# Patient Record
Sex: MALE | Race: BLACK OR AFRICAN AMERICAN | NOT HISPANIC OR LATINO | URBAN - METROPOLITAN AREA
[De-identification: names, ages, dates, MRNs, and addresses within clinical notes are randomized per-mention and may not be internally consistent; named-entity substitution may affect disease eponyms.]

---

## 2018-01-09 ENCOUNTER — INPATIENT (INPATIENT)
Facility: HOSPITAL | Age: 63
LOS: 2 days | Discharge: HOME | End: 2018-01-12
Attending: INTERNAL MEDICINE | Admitting: INTERNAL MEDICINE
Payer: COMMERCIAL

## 2018-01-09 VITALS
SYSTOLIC BLOOD PRESSURE: 153 MMHG | OXYGEN SATURATION: 98 % | RESPIRATION RATE: 16 BRPM | DIASTOLIC BLOOD PRESSURE: 98 MMHG | HEART RATE: 82 BPM | TEMPERATURE: 98 F

## 2018-01-09 LAB
ALBUMIN SERPL ELPH-MCNC: 4.7 G/DL — SIGNIFICANT CHANGE UP (ref 3.3–5)
ALP SERPL-CCNC: 48 U/L — SIGNIFICANT CHANGE UP (ref 40–120)
ALT FLD-CCNC: 32 U/L — SIGNIFICANT CHANGE UP (ref 4–41)
APPEARANCE UR: CLEAR — SIGNIFICANT CHANGE UP
AST SERPL-CCNC: 19 U/L — SIGNIFICANT CHANGE UP (ref 4–40)
BACTERIA # UR AUTO: SIGNIFICANT CHANGE UP
BASOPHILS # BLD AUTO: 0.01 K/UL — SIGNIFICANT CHANGE UP (ref 0–0.2)
BASOPHILS NFR BLD AUTO: 0.2 % — SIGNIFICANT CHANGE UP (ref 0–2)
BILIRUB SERPL-MCNC: 0.4 MG/DL — SIGNIFICANT CHANGE UP (ref 0.2–1.2)
BILIRUB UR-MCNC: NEGATIVE — SIGNIFICANT CHANGE UP
BLOOD UR QL VISUAL: NEGATIVE — SIGNIFICANT CHANGE UP
BUN SERPL-MCNC: 13 MG/DL — SIGNIFICANT CHANGE UP (ref 7–23)
CALCIUM SERPL-MCNC: 9 MG/DL — SIGNIFICANT CHANGE UP (ref 8.4–10.5)
CHLORIDE SERPL-SCNC: 102 MMOL/L — SIGNIFICANT CHANGE UP (ref 98–107)
CK MB BLD-MCNC: 1 — SIGNIFICANT CHANGE UP (ref 0–2.5)
CK MB BLD-MCNC: 1.33 NG/ML — SIGNIFICANT CHANGE UP (ref 1–6.6)
CK MB BLD-MCNC: 1.67 NG/ML — SIGNIFICANT CHANGE UP (ref 1–6.6)
CK MB BLD-MCNC: SIGNIFICANT CHANGE UP (ref 0–2.5)
CK SERPL-CCNC: 141 U/L — SIGNIFICANT CHANGE UP (ref 30–200)
CK SERPL-CCNC: 161 U/L — SIGNIFICANT CHANGE UP (ref 30–200)
CO2 SERPL-SCNC: 29 MMOL/L — SIGNIFICANT CHANGE UP (ref 22–31)
COLOR SPEC: YELLOW — SIGNIFICANT CHANGE UP
CREAT SERPL-MCNC: 1.03 MG/DL — SIGNIFICANT CHANGE UP (ref 0.5–1.3)
EOSINOPHIL # BLD AUTO: 0.07 K/UL — SIGNIFICANT CHANGE UP (ref 0–0.5)
EOSINOPHIL NFR BLD AUTO: 1.5 % — SIGNIFICANT CHANGE UP (ref 0–6)
GLUCOSE SERPL-MCNC: 82 MG/DL — SIGNIFICANT CHANGE UP (ref 70–99)
GLUCOSE UR-MCNC: NEGATIVE — SIGNIFICANT CHANGE UP
HCT VFR BLD CALC: 44.8 % — SIGNIFICANT CHANGE UP (ref 39–50)
HGB BLD-MCNC: 15.1 G/DL — SIGNIFICANT CHANGE UP (ref 13–17)
IMM GRANULOCYTES # BLD AUTO: 0.01 # — SIGNIFICANT CHANGE UP
IMM GRANULOCYTES NFR BLD AUTO: 0.2 % — SIGNIFICANT CHANGE UP (ref 0–1.5)
KETONES UR-MCNC: NEGATIVE — SIGNIFICANT CHANGE UP
LEUKOCYTE ESTERASE UR-ACNC: NEGATIVE — SIGNIFICANT CHANGE UP
LYMPHOCYTES # BLD AUTO: 1.91 K/UL — SIGNIFICANT CHANGE UP (ref 1–3.3)
LYMPHOCYTES # BLD AUTO: 40.9 % — SIGNIFICANT CHANGE UP (ref 13–44)
MCHC RBC-ENTMCNC: 30.3 PG — SIGNIFICANT CHANGE UP (ref 27–34)
MCHC RBC-ENTMCNC: 33.7 % — SIGNIFICANT CHANGE UP (ref 32–36)
MCV RBC AUTO: 90 FL — SIGNIFICANT CHANGE UP (ref 80–100)
MONOCYTES # BLD AUTO: 0.61 K/UL — SIGNIFICANT CHANGE UP (ref 0–0.9)
MONOCYTES NFR BLD AUTO: 13.1 % — SIGNIFICANT CHANGE UP (ref 2–14)
MUCOUS THREADS # UR AUTO: SIGNIFICANT CHANGE UP
NEUTROPHILS # BLD AUTO: 2.06 K/UL — SIGNIFICANT CHANGE UP (ref 1.8–7.4)
NEUTROPHILS NFR BLD AUTO: 44.1 % — SIGNIFICANT CHANGE UP (ref 43–77)
NITRITE UR-MCNC: NEGATIVE — SIGNIFICANT CHANGE UP
NRBC # FLD: 0 — SIGNIFICANT CHANGE UP
PH UR: 7 — SIGNIFICANT CHANGE UP (ref 4.6–8)
PLATELET # BLD AUTO: 235 K/UL — SIGNIFICANT CHANGE UP (ref 150–400)
PMV BLD: 10.2 FL — SIGNIFICANT CHANGE UP (ref 7–13)
POTASSIUM SERPL-MCNC: 3.8 MMOL/L — SIGNIFICANT CHANGE UP (ref 3.5–5.3)
POTASSIUM SERPL-SCNC: 3.8 MMOL/L — SIGNIFICANT CHANGE UP (ref 3.5–5.3)
PROT SERPL-MCNC: 7.5 G/DL — SIGNIFICANT CHANGE UP (ref 6–8.3)
PROT UR-MCNC: 20 MG/DL — SIGNIFICANT CHANGE UP
RBC # BLD: 4.98 M/UL — SIGNIFICANT CHANGE UP (ref 4.2–5.8)
RBC # FLD: 13 % — SIGNIFICANT CHANGE UP (ref 10.3–14.5)
RBC CASTS # UR COMP ASSIST: SIGNIFICANT CHANGE UP (ref 0–?)
SODIUM SERPL-SCNC: 143 MMOL/L — SIGNIFICANT CHANGE UP (ref 135–145)
SP GR SPEC: 1.02 — SIGNIFICANT CHANGE UP (ref 1–1.04)
SQUAMOUS # UR AUTO: SIGNIFICANT CHANGE UP
TROPONIN T SERPL-MCNC: < 0.06 NG/ML — SIGNIFICANT CHANGE UP (ref 0–0.06)
TROPONIN T SERPL-MCNC: < 0.06 NG/ML — SIGNIFICANT CHANGE UP (ref 0–0.06)
TSH SERPL-MCNC: 1.38 UIU/ML — SIGNIFICANT CHANGE UP (ref 0.27–4.2)
UROBILINOGEN FLD QL: NORMAL MG/DL — SIGNIFICANT CHANGE UP
WBC # BLD: 4.67 K/UL — SIGNIFICANT CHANGE UP (ref 3.8–10.5)
WBC # FLD AUTO: 4.67 K/UL — SIGNIFICANT CHANGE UP (ref 3.8–10.5)
WBC UR QL: HIGH (ref 0–?)

## 2018-01-09 PROCEDURE — 71046 X-RAY EXAM CHEST 2 VIEWS: CPT | Mod: 26

## 2018-01-09 RX ORDER — SODIUM CHLORIDE 9 MG/ML
1000 INJECTION INTRAMUSCULAR; INTRAVENOUS; SUBCUTANEOUS ONCE
Qty: 0 | Refills: 0 | Status: COMPLETED | OUTPATIENT
Start: 2018-01-09 | End: 2018-01-09

## 2018-01-09 RX ORDER — MECLIZINE HCL 12.5 MG
25 TABLET ORAL ONCE
Qty: 0 | Refills: 0 | Status: COMPLETED | OUTPATIENT
Start: 2018-01-09 | End: 2018-01-09

## 2018-01-09 RX ORDER — AMLODIPINE BESYLATE 2.5 MG/1
5 TABLET ORAL DAILY
Qty: 0 | Refills: 0 | Status: DISCONTINUED | OUTPATIENT
Start: 2018-01-09 | End: 2018-01-12

## 2018-01-09 RX ADMIN — Medication 25 MILLIGRAM(S): at 16:15

## 2018-01-09 RX ADMIN — SODIUM CHLORIDE 1000 MILLILITER(S): 9 INJECTION INTRAMUSCULAR; INTRAVENOUS; SUBCUTANEOUS at 16:15

## 2018-01-09 NOTE — ED CDU PROVIDER INITIAL DAY NOTE - OBJECTIVE STATEMENT
62 y.o male pmhx of HTN on amlodipine presents with nausea and dizziness for the past week, saw his PMD today who advised him to go to the ED for further evaluation. Pt states the nausea comes and goes and has episode sof intermittent vomiting. The dizziness he desribes only happens when exerting himself or lifting/working. States he feels "funny" no blurry vision, no room spinning sensation but more presynopal and has to sit down and rest for ~ 1 hour for symptoms to subside.  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 Denies fevers, chills, chest pain, SOB, cough, diarrhea, abdominal pain, numbness, tingling, weakness, urinary symptoms.  ER note above: ROHINI Reyna: 61 y/o male with a hx of HTN presents to the ER c/o 1 week of intermittent nausea and dizziness.  Symptoms occur during exertion.  Pt reports a few episodes of vomiting.  Pt denies chest pain, shortness of breath, abdominal pain, back pain, fevers, chills, recent illness.  Pt placed in CDU for cardiac monitoring and stress test.

## 2018-01-09 NOTE — ED ADULT TRIAGE NOTE - CHIEF COMPLAINT QUOTE
Pt sent in by his pmd for nausea and vomiting x 2 weeks Pt also c/o dizziness, worse with movement that started yesterday.  denies any pain, fevers, or shortness of breath, hx of htn, fs 121

## 2018-01-09 NOTE — ED ADULT NURSE NOTE - OBJECTIVE STATEMENT
pt received to intake 8, aox3.  pt c/o dizziness x a few days.  SL placed, labs sent, NS infusing well.  MD at bedside, will monitor

## 2018-01-09 NOTE — ED PROVIDER NOTE - ATTENDING CONTRIBUTION TO CARE
Luli: 61 yo male with no significant past medical history c/o dizziness and near syncope x several weeks worse x 4 days, now associated with SOB, nausea, and vomiting. Symptoms occur mostly with exertion although now are happening even at rest. No associated chest pain. No recent immobilization or travel. No LE pain or edema. Exam: abdomen is soft and nontender, no palpable masses. cardiac and lung exam unremarkable. no LE edema or calf TTP. 2+ distal pulses x 4 extremities. A/P- 61 yo male with symptoms concerning for cardiac ischemia. EKG unremarkbale. will obtain, labs, cardiac enzymes, CXR, EKG and likely CDU stay.

## 2018-01-09 NOTE — ED PROVIDER NOTE - OBJECTIVE STATEMENT
62 y.o male pmhx of HTN on amlodipine presents with nausea and dizziness for the past week, saw his PMD today who advised him to go to the ED for further evaluation. Pt states the nausea comes and goes and has episode sof intermittent vomiting. The dizziness he desribes only happens when exerting himself or lifting/working. States he feels "funny" no blurry vision, no room spinning sensation but more presynopal and has to sit down and rest for ~ 1 hour for symptoms to subside.  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 Denies fevers, chills, chest pain, SOB, cough, diarrhea, abdominal pain, numbness, tingling, weakness, urinary symptoms. 62 y.o male pmhx of HTN on amlodipine presents with nausea and dizziness for the past week, saw his PMD today who advised him to go to the ED for further evaluation. Pt states the nausea comes and goes and has episodes of intermittent vomiting. The dizziness he describes only happens when exerting himself or lifting/working. States he feels "funny" no blurry vision, no room spinning sensation but more presynopal and has to sit down and rest for ~ 1 hour for symptoms to subside.  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 Denies fevers, chills, chest pain, SOB, cough, diarrhea, abdominal pain, numbness, tingling, weakness, urinary symptoms.

## 2018-01-09 NOTE — ED CDU PROVIDER INITIAL DAY NOTE - ATTENDING CONTRIBUTION TO CARE
Luli: 63 yo male c/o intermittent dizziness, nausea, and vomiting x 1 week. Symptoms worsening and occurring with exertion and more frequently now. No abdominal pain or chest pain. +associated SOB. Exam: cardiac and lung exam unremarkable, no LE edema or calf TTP. 2+ distal pulses x 4 extremities. abdomen is soft and nontender ED workup until this point unremarkable, CDU plan for serial enzymes/EKG and stress in AM.

## 2018-01-09 NOTE — ED PROVIDER NOTE - MEDICAL DECISION MAKING DETAILS
62 y.o male pmhx of HTN on amlodipine presents with nausea and dizziness for the past week, dizziness is exertional and described for as presyncopal. will obtain labs, EKG, chest xray, cardiac enzymes, meclizine, fluids, ua, reassesses. 62 y.o male pmhx of HTN on amlodipine presents with nausea and dizziness for the past week, dizziness is exertional and described as presyncopal. will obtain labs, EKG, chest xray, cardiac enzymes, meclizine, fluids, ua, reassesses.

## 2018-01-09 NOTE — ED PROVIDER NOTE - PROGRESS NOTE DETAILS
ROHINI Jean: pt admits to feeling better, discussed staying in CDU for CE and Stress. Pt agrees with plan, discussed with ROHINI Reyna.

## 2018-01-10 LAB — HBA1C BLD-MCNC: 6.1 % — HIGH (ref 4–5.6)

## 2018-01-10 PROCEDURE — 78452 HT MUSCLE IMAGE SPECT MULT: CPT | Mod: 26

## 2018-01-10 PROCEDURE — 93018 CV STRESS TEST I&R ONLY: CPT | Mod: GC

## 2018-01-10 PROCEDURE — 93016 CV STRESS TEST SUPVJ ONLY: CPT | Mod: GC

## 2018-01-10 RX ADMIN — AMLODIPINE BESYLATE 5 MILLIGRAM(S): 2.5 TABLET ORAL at 05:25

## 2018-01-10 NOTE — ED CDU PROVIDER SUBSEQUENT DAY NOTE - MEDICAL DECISION MAKING DETAILS
63 yo M here for dizziness, near syncope which improved with rest. no events overnight. pending stress.

## 2018-01-10 NOTE — ED CDU PROVIDER SUBSEQUENT DAY NOTE - PROGRESS NOTE DETAILS
ROHINI Reyna: Pt sleeping comfortably in Bed NAD.  Pt on cardiac monitor.  Cardiac Enzymes x 2 negative.  Pt scheduled for stress test in AM.  Will continue to monitor. Pt signed out to me by ROHINI Knott: Pt is in the CDU for resting images in the AM as his stress test had possible abnormality. Pt is resting comfortably in bed, NAD, VSS on cardiac monitor. Will continue to monitor

## 2018-01-11 DIAGNOSIS — R07.9 CHEST PAIN, UNSPECIFIED: ICD-10-CM

## 2018-01-11 DIAGNOSIS — R94.39 ABNORMAL RESULT OF OTHER CARDIOVASCULAR FUNCTION STUDY: ICD-10-CM

## 2018-01-11 DIAGNOSIS — Z29.9 ENCOUNTER FOR PROPHYLACTIC MEASURES, UNSPECIFIED: ICD-10-CM

## 2018-01-11 DIAGNOSIS — I10 ESSENTIAL (PRIMARY) HYPERTENSION: ICD-10-CM

## 2018-01-11 LAB — TROPONIN T SERPL-MCNC: < 0.06 NG/ML — SIGNIFICANT CHANGE UP (ref 0–0.06)

## 2018-01-11 RX ORDER — ASPIRIN/CALCIUM CARB/MAGNESIUM 324 MG
162 TABLET ORAL DAILY
Qty: 0 | Refills: 0 | Status: DISCONTINUED | OUTPATIENT
Start: 2018-01-11 | End: 2018-01-11

## 2018-01-11 RX ORDER — ASPIRIN/CALCIUM CARB/MAGNESIUM 324 MG
81 TABLET ORAL DAILY
Qty: 0 | Refills: 0 | Status: DISCONTINUED | OUTPATIENT
Start: 2018-01-11 | End: 2018-01-12

## 2018-01-11 RX ORDER — ATORVASTATIN CALCIUM 80 MG/1
40 TABLET, FILM COATED ORAL AT BEDTIME
Qty: 0 | Refills: 0 | Status: DISCONTINUED | OUTPATIENT
Start: 2018-01-11 | End: 2018-01-12

## 2018-01-11 RX ORDER — METOPROLOL TARTRATE 50 MG
12.5 TABLET ORAL
Qty: 0 | Refills: 0 | Status: DISCONTINUED | OUTPATIENT
Start: 2018-01-11 | End: 2018-01-12

## 2018-01-11 RX ORDER — HEPARIN SODIUM 5000 [USP'U]/ML
5000 INJECTION INTRAVENOUS; SUBCUTANEOUS EVERY 12 HOURS
Qty: 0 | Refills: 0 | Status: DISCONTINUED | OUTPATIENT
Start: 2018-01-11 | End: 2018-01-12

## 2018-01-11 RX ADMIN — Medication 162 MILLIGRAM(S): at 19:31

## 2018-01-11 RX ADMIN — AMLODIPINE BESYLATE 5 MILLIGRAM(S): 2.5 TABLET ORAL at 06:58

## 2018-01-11 RX ADMIN — ATORVASTATIN CALCIUM 40 MILLIGRAM(S): 80 TABLET, FILM COATED ORAL at 22:28

## 2018-01-11 NOTE — ED CDU PROVIDER SUBSEQUENT DAY NOTE - CARDIAC, MLM
Normal rate, regular rhythm.  Heart sounds S1, S2.  No murmurs, rubs or gallops.
Normal rate, regular rhythm.  Heart sounds S1, S2.

## 2018-01-11 NOTE — ED CDU PROVIDER SUBSEQUENT DAY NOTE - ATTENDING CONTRIBUTION TO CARE
Dr. Malave:  I performed a face to face bedside interview with patient regarding history of present illness, review of symptoms and past medical history. I completed an independent physical exam.  I have discussed patient's plan of care with PA.   I agree with note as stated above, having amended the EMR as needed to reflect my findings.   This includes HISTORY OF PRESENT ILLNESS, HIV, PAST MEDICAL/SURGICAL/FAMILY/SOCIAL HISTORY, ALLERGIES AND HOME MEDICATIONS, REVIEW OF SYSTEMS, PHYSICAL EXAM, and any PROGRESS NOTES during the time I functioned as the attending physician for this patient.    62M h/o HTN sent by PCP to ED for evaluation of intermittent dizziness and nausea associated with exertion/activity.  Describes near-syncope sensation, occurred while shoveling snow, improved with rest.  No complaints overnight in CDU.       Exam:  - nad  - rrr  - ctab  - abd soft ntnd  - no pedal edema  - normal gait, sensation equal to light touch bilateral extremities, strength equal bilateral extremities;    A/P  - dizziness with exertion, r/o ACS  - pending stress test this morning
I, Jennifer Cabot, MD, have performed a history and physical exam of the patient and discussed their management with the ACP.  I reviewed the PA's note and agree with the documented findings and plan of care. My medical decision making and observations are found above.    Cabot: 62M with HTN, here with 1 week of nausea and dizziness with exertion.  Trops are neg x 2, CXR neg.  Denies sx at this time and with ambulation this AM.  On exam, HDS, NAD, lungs CTAB, heart sounds with systolic murmur, regular rhythm, abd benign, LEs without edema.  Pending resting images today.

## 2018-01-11 NOTE — H&P ADULT - GASTROINTESTINAL DETAILS
bowel sounds normal/normal/nontender/no distention/no rebound tenderness/no rigidity/soft/no guarding

## 2018-01-11 NOTE — CONSULT NOTE ADULT - SUBJECTIVE AND OBJECTIVE BOX
Patient seen and evaluated @ 4:00 PM  Chief Complaint: Dizziness    HPI:  62M with HTN presents with nausea and dizziness/lightheadedness x 1 week. Sent in to ED by PMD. Nausea and dizziness intermittent, worse with exertion and strenuous activity. In particular worse with shoveling snow. Resolves after 5 minutes of rest. No CP, palpitations, SOB. No LOC.    No hx of cardiac disease or MI. No family hx of CAD.    EKG unremarkable.    Abnormal stress test as below.    Plan for cardiac cath.    PMH:   No pertinent past medical history    PSH:   No significant past surgical history    Medications:   amLODIPine   Tablet 5 milliGRAM(s) Oral daily  aspirin  chewable 162 milliGRAM(s) Oral daily    Allergies:  No Known Allergies    FAMILY HISTORY:  No pertinent family history in first degree relatives    Social History:  Never smoker    Review of Systems:  Constitutional: [ ] Fever [ ] Chills [ ] Fatigue [ ] Weight change   HEENT: [ ] Blurred vision [ ] Eye Pain [ ] Headache [ ] Runny nose [ ] Sore Throat   Respiratory: [ ] Cough [ ] Wheezing [ ] Shortness of breath  Cardiovascular: [ ] Chest Pain [ ] Palpitations [ ] JACOBS [ ] PND [ ] Orthopnea  Gastrointestinal: [ ] Abdominal Pain [ ] Diarrhea [ ] Constipation [ ] Hemorrhoids [ ] Nausea [ ] Vomiting  Genitourinary: [ ] Nocturia [ ] Dysuria [ ] Incontinence  Extremities: [ ] Swelling [ ] Joint Pain  Neurologic: [ ] Focal deficit [ ] Paresthesias [ ] Syncope  Lymphatic: [ ] Swelling [ ] Lymphadenopathy   Skin: [ ] Rash [ ] Ecchymoses [ ] Wounds [ ] Lesions  Psychiatry: [ ] Depression [ ] Suicidal/Homicidal Ideation [ ] Anxiety [ ] Sleep Disturbances  [ ] 10 point review of systems is otherwise negative except as mentioned above            [ ]Unable to obtain    Physical Exam:  T(C): 37.1 (01-11-18 @ 14:39), Max: 37.2 (01-11-18 @ 10:15)  HR: 88 (01-11-18 @ 14:39) (68 - 90)  BP: 137/94 (01-11-18 @ 14:39) (134/92 - 146/99)  RR: 16 (01-11-18 @ 10:15) (16 - 16)  SpO2: 99% (01-11-18 @ 10:15) (97% - 99%)  Wt(kg): --    Daily     Daily     Appearance: NAD  Eyes: PERRL, EOMI  HENT: Normal oral mus[place]a, NC/AT  Cardiovascular: normal S1 and S2, RRR, no m/r/g, no edema, normal JVP  Respiratory: Clear to auscultation bilaterally  Gastrointestinal: Soft, non-tender, non-distended, BS+  Musculoskeletal: No clubbing, no joint deformity   Neurologic: Non-focal  Lymphatic: No lymphadenopathy  Psychiatry: AAOx3, mood & affect appropriate  Skin: No rashes, no ecchymoses, no cyanosis    Cardiovascular Diagnostic Testing:  ECG: NSR, no significant ischemic changes    Echo:  none    Stress Testing:  IMPRESSIONS:Normal Study  * Myocardial Perfusion SPECT results are probably  abnormal.  * There is a small, moderate defect in inferior wall, that  was reversble, suggestive of ischemia. The observed defect  was significantly less prominent on prone imaging for  attenuation correction, suggesting that the observed  findings may in part be due to attenuation from the  diaphragm.  * Post-stress gated wall motion analysis was performed  (LVEF = 56 %;LVEDV = 85 ml.), revealing normal LV  function. there was focal proximal to mid inferior wall  hypokinesis with mildly diminished systolic thickening. RV  function appeared normal.    Cath:  none    Interpretation of Telemetry: sinus    Imaging:  CXR wnl    Labs:            CARDIAC MARKERS ( 09 Jan 2018 22:15 )  x     / < 0.06 ng/mL / 161 u/L / 1.67 ng/mL / x              Hemoglobin A1C, Whole Blood: 6.1 % (01-09 @ 15:58)    Thyroid Stimulating Hormone, Serum: 1.38 uIU/mL (01-09 @ 16:05)

## 2018-01-11 NOTE — H&P ADULT - PROBLEM SELECTOR PLAN 1
Will monitor on telemetry, serial EKG and CE prn for any episodes of chest pain  HgbA1C, TSH, lipid profile, CBC, CMP in am  Started patient on Metroprolol 12.5mg BID, Lipitor 40mg and Aspirin 81mg daily   Will make patient NPO after midnight for LHC in am 1/12

## 2018-01-11 NOTE — H&P ADULT - NSHPLABSRESULTS_GEN_ALL_CORE
CXR: No focal consolidations or edema.    1/11/18 Nuclear stress test: Post-stress gated wall motion analysis was performed (LVEF= 56 %;LVEDV = 85 ml.), revealing normal LV function. There was focal proximal to mid inferior wall hypokinesis with mildly diminished systolic thickening. RV function appeared normal. IMPRESSIONS: Normal Study.  Myocardial Perfusion SPECT results are probably abnormal. There is a small, moderate defect in inferior wall, that was reversble, suggestive of ischemia. The observed defect was significantly less prominent on prone imaging for attenuation correction, suggesting that the observed findings may in part be due to attenuation from the diaphragm. Post-stress gated wall motion analysis was performed (LVEF = 56 %;LVEDV = 85 ml.), revealing normal LV function. there was focal proximal to mid inferior wall hypokinesis with mildly diminished systolic thickening. RV function appeared normal.    EKG: NSR at a rate of 80 with Qtc of 422

## 2018-01-11 NOTE — ED CDU PROVIDER DISPOSITION NOTE - CLINICAL COURSE
62M with HTN, here with 1 week of nausea and dizziness with exertion.  Trops were neg x 2, CXR neg.  Stress test was abnormal, prompting resting images that showed a large reversible defect in the anterior wall.  Spoke with cardiology, who would like to admit for a catheterization today.  The patient is current asymptomatic.  On exam, HDS, NAD, lungs CTAB, heart sounds with systolic murmur, regular rhythm, abd benign, LEs without edema.  Will admit the patient.

## 2018-01-11 NOTE — H&P ADULT - NEGATIVE NEUROLOGICAL SYMPTOMS
no headache/no loss of consciousness/no hemiparesis/no focal seizures/no syncope/no difficulty walking/no tremors/no vertigo/no loss of sensation/no confusion/no weakness/no generalized seizures/no transient paralysis/no paresthesias

## 2018-01-11 NOTE — H&P ADULT - ATTENDING COMMENTS
Pt seen and examined agree with plan above     1) + stress test - s/p cath, shows LM normal, LAD mid 20%, LCX normal and RCA normal     2) HTN - uncontrolled increase norvasc to 10mg daily     3) Dizziness - improved  d/c home

## 2018-01-11 NOTE — ED CDU PROVIDER SUBSEQUENT DAY NOTE - GASTROINTESTINAL, MLM
Abdomen soft, non-tender, no rebound, no guarding.
Abdomen soft, non-tender, no rebound, no guarding.

## 2018-01-11 NOTE — H&P ADULT - NEGATIVE GASTROINTESTINAL SYMPTOMS
no diarrhea/no melena/no change in bowel habits/no hematochezia/no abdominal pain/no constipation/no nausea/no vomiting

## 2018-01-11 NOTE — ED CDU PROVIDER SUBSEQUENT DAY NOTE - HISTORY
62yM with pmhx HTN presented to the ED with one week of intermittent nausea and dizziness upon exertion. Pt had negative cardiac enzymes and is on cardiac monitoring. Stress test with possible abnormality, pending rest images in the morning.
63 y/o male with a hx of HTN presents to the ER c/o 1 week of intermittent nausea and dizziness.  Symptoms occur during exertion.  Pt reports symptoms have occurred in the past and typically resolve after sitting and resting.  Pt denies chest pain, shortness of breath, abdominal pain, back pain, fevers, chills, recent illness.  Pt placed in CDU for cardiac monitoring and stress test. CE x 2 negative and tele uneventful, pending stress this AM.

## 2018-01-11 NOTE — ED CDU PROVIDER SUBSEQUENT DAY NOTE - SKIN, MLM
Skin normal color for race, warm, dry and intact. No evidence of rash.
Skin normal color for race, warm, dry and intact. No evidence of rash.

## 2018-01-11 NOTE — H&P ADULT - HISTORY OF PRESENT ILLNESS
63 y/o M with PMH of HTN presented with nausea and dizziness/lightheadedness for the past week. As per the patient he developed dizziness and lightheadedness for the past week but the worst episode was on Monday. Patient stated that he was exerting himself, taking the snow out of his car when he developed sudden onset lightheadedness and then he sat down and within few seconds the dizziness/lightheadedness resolved by itself. Patient stated that when he is resting the dizziness and lightheadedness does not occur. Patient stated that on Tuesday he had similar episode and went to the PMD and was told to come to the ER. Patient stated that he had 2 episodes of NBNB vomiting yesterday. Patient stated that this episode of the dizziness/lightheadedness was more severe than the prior episodes he has had in the past. Patient denied any CP, fevers, chills, N/V/D/C, abdominal pain, cough, melena, hematochezia, dysuria, recent travel, sick contact, pleuritic or positional chest pain.     On ED admission EKG revealed NSR at a rate of 80 with Qtc of 422, CE x 2: Negative, HgA1C: 6.1, UA: Negative. CXR: No focal consolidations or edema. 1/11/18 Nuclear stress test: Post-stress gated wall motion analysis was performed (LVEF= 56 %;LVEDV = 85 ml.), revealing normal LV function. There was focal proximal to mid inferior wall hypokinesis with mildly diminished systolic thickening. RV function appeared normal. IMPRESSIONS: Normal Study.  Myocardial Perfusion SPECT results are probably abnormal. There is a small, moderate defect in inferior wall, that was reversible, suggestive of ischemia. The observed defect was significantly less prominent on prone imaging for attenuation correction, suggesting that the observed findings may in part be due to attenuation from the diaphragm. Post-stress gated wall motion analysis was performed (LVEF = 56 %;LVEDV = 85 ml.), revealing normal LV function. There was focal proximal to mid inferior wall hypokinesis with mildly diminished systolic thickening. RV function appeared normal. When examined patient is resting in the stretcher and denied any current complaints such as dizziness/lightheadedness or any CP.

## 2018-01-11 NOTE — ED CDU PROVIDER DISPOSITION NOTE - ATTENDING CONTRIBUTION TO CARE
I, Jennifer Cabot, MD, have performed a history and physical exam of the patient and discussed their management with the ACP.  I wrote th note. My medical decision making and observations are found above.    62M with HTN, here with 1 week of nausea and dizziness with exertion.  Trops were neg x 2, CXR neg.  Stress test was abnormal, prompting resting images that showed a large reversible defect in the anterior wall.  Spoke with cardiology, who would like to admit for a catheterization today.  The patient is current asymptomatic.  On exam, HDS, NAD, lungs CTAB, heart sounds with systolic murmur, regular rhythm, abd benign, LEs without edema.  Will admit the patient.

## 2018-01-11 NOTE — H&P ADULT - NEGATIVE ENMT SYMPTOMS
no nasal congestion/no sinus symptoms/no tinnitus/no vertigo/no hearing difficulty/no ear pain/no nasal discharge

## 2018-01-11 NOTE — H&P ADULT - RS GEN PE MLT RESP DETAILS PC
no wheezes/no chest wall tenderness/rales/no intercostal retractions/normal/no rhonchi/respirations non-labored/airway patent

## 2018-01-11 NOTE — ED CDU PROVIDER SUBSEQUENT DAY NOTE - PROGRESS NOTE DETAILS
ROHINI Clark: pt NAD, VSS, not complaining of lightheadedness, CP, SOB. No events on tele. PE: cardiac: RRR, Lungs: CTA, abdomen: soft, nontender, nondistended. Discussed the results of the labs/imaging with the patient. Pending resting images. Cabot: Pt signed out to me.  62M with HTN, here with 1 week of nausea and dizziness with exertion.  Trops are neg x 2, CXR neg.  Denies sx at this time and with ambulation this AM.  On exam, HDS, NAD, lungs CTAB, heart sounds with systolic murmur, regular rhythm, abd benign, LEs without edema.  Pending resting images today. Cabot:  Received a call from the stress lab stating that the resting images were very abnormal, with a large reversible defect in the anterior wall.  Notified the patient and planned to call the tele doc, but the patient stated that he wanted to leave.  I stressed the importance of staying and being further evaluated with a cath, stated that he could have an MI with a risk of death if he leaves, but he thinks that we are delaying treatment on purpose.  I explained that it takes time to read stress tests, and we were not delaying and that I would very much like him to stay.  He then agreed to speak with the tele doc.  Will call the tele doc now. Cabot:  Received a call from the stress lab stating that the resting images were very abnormal, with a large reversible defect in the anterior wall.  Notified the patient and planned to call a cardiology consult, but the patient stated that he wanted to leave.  I stressed the importance of staying and being further evaluated with a cath, stated that he could have an MI with a risk of death if he leaves, but he thinks that we are delaying treatment on purpose.  I explained that it takes time to read stress tests, and we were not delaying and that I would very much like him to stay.  He then agreed to speak with the cardiologist.  Will call for a cardiology consult now. Cabot: Spoke with the cardiology fellow, who agrees that the patient needs to be admitted for a cath and stated that he will be down to speak with the patient as soon as possible.

## 2018-01-11 NOTE — ED CDU PROVIDER SUBSEQUENT DAY NOTE - CARDIOVASCULAR NEGATIVE STATEMENT, MLM
normal rate and rhythm, no chest pain and no edema.
normal rate and rhythm, no chest pain and no edema. + dizziness

## 2018-01-11 NOTE — H&P ADULT - NEGATIVE MUSCULOSKELETAL SYMPTOMS
no stiffness/no arthralgia/no myalgia/no arthritis/no muscle cramps/no neck pain/no joint swelling/no muscle weakness

## 2018-01-11 NOTE — CONSULT NOTE ADULT - ASSESSMENT
62M with HTN presents with nausea and dizziness/lightheadedness x 1 week with abnormal stress test with reversible defect in inferior wall and hypokinesis. Plan for cardiac cath and likely RCA intervention.    -- will require DAPT post procedure  -- metoprolol 12.5 mg BID  -- atorvastatin 40 mg    Klever Gao MD

## 2018-01-11 NOTE — H&P ADULT - NEGATIVE CARDIOVASCULAR SYMPTOMS
no paroxysmal nocturnal dyspnea/no dyspnea on exertion/no orthopnea/no chest pain/no peripheral edema/no palpitations

## 2018-01-11 NOTE — ED CDU PROVIDER SUBSEQUENT DAY NOTE - MUSCULOSKELETAL, MLM
Spine appears normal, range of motion is not limited, no muscle or joint tenderness
Spine appears normal, range of motion is not limited, no muscle or joint tenderness

## 2018-01-11 NOTE — ED CDU PROVIDER SUBSEQUENT DAY NOTE - MEDICAL DECISION MAKING DETAILS
62yM w/pmhx HTN presented with one week of nausea and dizziness with exertion. Negative cardiac enzymes, abnormal stress in CDU for resting images in the AM 62yM w/pmhx HTN presented with one week of nausea and dizziness with exertion. Negative cardiac enzymes, abnormal stress in CDU for resting images in the AM    Cabot: 62M with HTN, here with 1 week of nausea and dizziness with exertion.  Trops are neg x 2, CXR neg.  Denies sx at this time and with ambulation this AM.  On exam, HDS, NAD, lungs CTAB, heart sounds with systolic murmur, regular rhythm, abd benign, LEs without edema.  Pending resting images today.

## 2018-01-11 NOTE — H&P ADULT - NEGATIVE OPHTHALMOLOGIC SYMPTOMS
no lacrimation L/no diplopia/no blurred vision L/no photophobia/no discharge L/no blurred vision R/no discharge R/no lacrimation R

## 2018-01-12 ENCOUNTER — TRANSCRIPTION ENCOUNTER (OUTPATIENT)
Age: 63
End: 2018-01-12

## 2018-01-12 VITALS
OXYGEN SATURATION: 99 % | HEART RATE: 74 BPM | DIASTOLIC BLOOD PRESSURE: 97 MMHG | TEMPERATURE: 98 F | RESPIRATION RATE: 16 BRPM | SYSTOLIC BLOOD PRESSURE: 154 MMHG

## 2018-01-12 LAB
BUN SERPL-MCNC: 16 MG/DL — SIGNIFICANT CHANGE UP (ref 7–23)
CALCIUM SERPL-MCNC: 8.5 MG/DL — SIGNIFICANT CHANGE UP (ref 8.4–10.5)
CHLORIDE SERPL-SCNC: 103 MMOL/L — SIGNIFICANT CHANGE UP (ref 98–107)
CHOLEST SERPL-MCNC: 204 MG/DL — HIGH (ref 120–199)
CO2 SERPL-SCNC: 27 MMOL/L — SIGNIFICANT CHANGE UP (ref 22–31)
CREAT SERPL-MCNC: 1.05 MG/DL — SIGNIFICANT CHANGE UP (ref 0.5–1.3)
GLUCOSE SERPL-MCNC: 108 MG/DL — HIGH (ref 70–99)
HCT VFR BLD CALC: 43.3 % — SIGNIFICANT CHANGE UP (ref 39–50)
HDLC SERPL-MCNC: 40 MG/DL — SIGNIFICANT CHANGE UP (ref 35–55)
HGB BLD-MCNC: 14.9 G/DL — SIGNIFICANT CHANGE UP (ref 13–17)
LIPID PNL WITH DIRECT LDL SERPL: 149 MG/DL — SIGNIFICANT CHANGE UP
MAGNESIUM SERPL-MCNC: 2.1 MG/DL — SIGNIFICANT CHANGE UP (ref 1.6–2.6)
MCHC RBC-ENTMCNC: 30.5 PG — SIGNIFICANT CHANGE UP (ref 27–34)
MCHC RBC-ENTMCNC: 34.4 % — SIGNIFICANT CHANGE UP (ref 32–36)
MCV RBC AUTO: 88.5 FL — SIGNIFICANT CHANGE UP (ref 80–100)
NRBC # FLD: 0 — SIGNIFICANT CHANGE UP
PLATELET # BLD AUTO: 210 K/UL — SIGNIFICANT CHANGE UP (ref 150–400)
PMV BLD: 10.2 FL — SIGNIFICANT CHANGE UP (ref 7–13)
POTASSIUM SERPL-MCNC: 3.8 MMOL/L — SIGNIFICANT CHANGE UP (ref 3.5–5.3)
POTASSIUM SERPL-SCNC: 3.8 MMOL/L — SIGNIFICANT CHANGE UP (ref 3.5–5.3)
RBC # BLD: 4.89 M/UL — SIGNIFICANT CHANGE UP (ref 4.2–5.8)
RBC # FLD: 12.8 % — SIGNIFICANT CHANGE UP (ref 10.3–14.5)
SODIUM SERPL-SCNC: 141 MMOL/L — SIGNIFICANT CHANGE UP (ref 135–145)
TRIGL SERPL-MCNC: 98 MG/DL — SIGNIFICANT CHANGE UP (ref 10–149)
WBC # BLD: 4.7 K/UL — SIGNIFICANT CHANGE UP (ref 3.8–10.5)
WBC # FLD AUTO: 4.7 K/UL — SIGNIFICANT CHANGE UP (ref 3.8–10.5)

## 2018-01-12 RX ORDER — AMLODIPINE BESYLATE 2.5 MG/1
1 TABLET ORAL
Qty: 30 | Refills: 0
Start: 2018-01-12 | End: 2018-02-10

## 2018-01-12 RX ORDER — SODIUM CHLORIDE 9 MG/ML
500 INJECTION INTRAMUSCULAR; INTRAVENOUS; SUBCUTANEOUS
Qty: 0 | Refills: 0 | Status: DISCONTINUED | OUTPATIENT
Start: 2018-01-12 | End: 2018-01-12

## 2018-01-12 RX ORDER — AMLODIPINE BESYLATE 2.5 MG/1
5 TABLET ORAL ONCE
Qty: 0 | Refills: 0 | Status: DISCONTINUED | OUTPATIENT
Start: 2018-01-12 | End: 2018-01-12

## 2018-01-12 RX ORDER — AMLODIPINE BESYLATE 2.5 MG/1
1 TABLET ORAL
Qty: 0 | Refills: 0 | COMMUNITY

## 2018-01-12 RX ADMIN — AMLODIPINE BESYLATE 5 MILLIGRAM(S): 2.5 TABLET ORAL at 07:16

## 2018-01-12 RX ADMIN — HEPARIN SODIUM 5000 UNIT(S): 5000 INJECTION INTRAVENOUS; SUBCUTANEOUS at 07:16

## 2018-01-12 RX ADMIN — Medication 12.5 MILLIGRAM(S): at 07:16

## 2018-01-12 NOTE — DISCHARGE NOTE ADULT - CARE PROVIDER_API CALL
Yvon Shaw), Cardiovascular Disease; Internal Medicine; Nuclear Cardiology  [address] [place] [address]  Phone: [phone]  Fax: [phone]

## 2018-01-12 NOTE — DISCHARGE NOTE ADULT - PATIENT PORTAL LINK FT
“You can access the [facility] Patient Portal, offered by [facility], by registering with the following website: [email or web address]”

## 2018-01-12 NOTE — DISCHARGE NOTE ADULT - CARE PLAN
Principal Discharge DX:	Atypical chest pain  Goal:	remain chest pain free  Instructions for follow-up, activity and diet:	better blood pressure management  low salt, low fat, low cholesterol  Secondary Diagnosis:	Essential hypertension  Goal:	goal /60  Instructions for follow-up, activity and diet:	increase Norvasc to 10mg oral daily  low salt, low fat, low cholesterol   Follow up with Dr Shaw in 1-2 weeks Principal Discharge DX:	Atypical chest pain  Goal:	remain chest pain free  Instructions for follow-up, activity and diet:	better blood pressure management  low salt, low fat, low cholesterol  Secondary Diagnosis:	Essential hypertension  Goal:	goal /60  Instructions for follow-up, activity and diet:	increase Norvasc to 10mg oral daily  low salt, low fat, low cholesterol   Follow up with Dr Shaw in 1-2 weeks  Secondary Diagnosis:	Pre-diabetes  Goal:	monitor your pre-diabetes  Instructions for follow-up, activity and diet:	continue to monitor your pre-diabetes with your PMD

## 2018-01-12 NOTE — DISCHARGE NOTE ADULT - HOSPITAL COURSE
63 y/o M with PMH of HTN presented with nausea and dizziness/lightheadedness for the past week. As per the patient he developed dizziness and lightheadedness for the past week but the worst episode was on Monday. Patient stated that he was exerting himself, taking the snow out of his car when he developed sudden onset lightheadedness and then he sat down and within few seconds the dizziness/lightheadedness resolved by itself. Patient stated that when he is resting the dizziness and lightheadedness does not occur. Patient stated that on Tuesday he had similar episode and went to the PMD and was told to come to the ER. Patient stated that he had 2 episodes of NBNB vomiting yesterday. Patient stated that this episode of the dizziness/lightheadedness was more severe than the prior episodes he has had in the past. Patient denied any CP, fevers, chills, N/V/D/C, abdominal pain, cough, melena, hematochezia, dysuria, recent travel, sick contact, pleuritic or positional chest pain.     On ED admission EKG revealed NSR at a rate of 80 with Qtc of 422, CE x 2: Negative, HgA1C: 6.1, UA: Negative. CXR: No focal consolidations or edema. 1/11/18 Nuclear stress test: Post-stress gated wall motion analysis was performed (LVEF= 56 %;LVEDV = 85 ml.), revealing normal LV function. There was focal proximal to mid inferior wall hypokinesis with mildly diminished systolic thickening. RV function appeared normal. IMPRESSIONS: Normal Study.  Myocardial Perfusion SPECT results are probably abnormal. There is a small, moderate defect in inferior wall, that was reversible, suggestive of ischemia. The observed defect was significantly less prominent on prone imaging for attenuation correction, suggesting that the observed findings may in part be due to attenuation from the diaphragm. Post-stress gated wall motion analysis was performed (LVEF = 56 %;LVEDV = 85 ml.), revealing normal LV function. There was focal proximal to mid inferior wall hypokinesis with mildly diminished systolic thickening. RV function appeared normal. When examined patient is resting in the stretcher and denied any current complaints such as dizziness/lightheadedness or any CP.     Underwent a Cardiac catheterization via right radial artery access with failed approach requiring right femoral artery access revealing normal coronary arteries. Patient stable for discharge this afternoon as discussed with Dr MICHAEL Shaw with increased norvasc dose to 10mg daily. Patient to be discharge if radial and groin sites remain stable with ambulation and with outpatient Cardiology follow up with Dr Shaw to optimize BP management. 63 y/o M with PMH of HTN presented with nausea and dizziness/lightheadedness for the past week. As per the patient he developed dizziness and lightheadedness for the past week but the worst episode was on Monday. Patient stated that he was exerting himself, taking the snow out of his car when he developed sudden onset lightheadedness and then he sat down and within few seconds the dizziness/lightheadedness resolved by itself. Patient stated that when he is resting the dizziness and lightheadedness does not occur. Patient stated that on Tuesday he had similar episode and went to the PMD and was told to come to the ER. Patient stated that he had 2 episodes of NBNB vomiting yesterday. Patient stated that this episode of the dizziness/lightheadedness was more severe than the prior episodes he has had in the past. Patient denied any CP, fevers, chills, N/V/D/C, abdominal pain, cough, melena, hematochezia, dysuria, recent travel, sick contact, pleuritic or positional chest pain.     On ED admission EKG revealed NSR at a rate of 80 with Qtc of 422, CE x 2: Negative, HgA1C: 6.1, UA: Negative. CXR: No focal consolidations or edema. 1/11/18 Nuclear stress test: Post-stress gated wall motion analysis was performed (LVEF= 56 %;LVEDV = 85 ml.), revealing normal LV function. There was focal proximal to mid inferior wall hypokinesis with mildly diminished systolic thickening. RV function appeared normal. IMPRESSIONS: Normal Study.  Myocardial Perfusion SPECT results are probably abnormal. There is a small, moderate defect in inferior wall, that was reversible, suggestive of ischemia. The observed defect was significantly less prominent on prone imaging for attenuation correction, suggesting that the observed findings may in part be due to attenuation from the diaphragm. Post-stress gated wall motion analysis was performed (LVEF = 56 %;LVEDV = 85 ml.), revealing normal LV function. There was focal proximal to mid inferior wall hypokinesis with mildly diminished systolic thickening. RV function appeared normal. When examined patient is resting in the stretcher and denied any current complaints such as dizziness/lightheadedness or any CP.     Underwent a Cardiac catheterization via right radial artery access with failed approach requiring right femoral artery access revealing EF 60%, LVEDP 11, normal coronary arteries. Patient stable for discharge this afternoon as discussed with Dr MICHAEL Shaw with increased norvasc dose to 10mg daily. Patient to be discharge if radial and groin sites remain stable with ambulation and with outpatient Cardiology follow up with Dr Shaw to optimize BP management.   The patient was noted to have elevated HgbA1c 6.1. The patient was made aware. Prediabetes education given. The patient was recommended to f/u with PMD for further treatment.

## 2018-01-12 NOTE — DISCHARGE NOTE ADULT - PLAN OF CARE
remain chest pain free better blood pressure management  low salt, low fat, low cholesterol goal /60 increase Norvasc to 10mg oral daily  low salt, low fat, low cholesterol   Follow up with Dr Shaw in 1-2 weeks monitor your pre-diabetes continue to monitor your pre-diabetes with your PMD

## 2018-01-12 NOTE — CONSULT NOTE ADULT - SUBJECTIVE AND OBJECTIVE BOX
Chief Complaint/Reason for Admission: Dizziness/lightheadedness and nausea	  History of Present Illness: 	  63 y/o M with PMH of HTN presented with nausea and dizziness/lightheadedness for the past week. As per the patient he developed dizziness and lightheadedness for the past week but the worst episode was on Monday. Patient stated that he was exerting himself, taking the snow out of his car when he developed sudden onset lightheadedness and then he sat down and within few seconds the dizziness/lightheadedness resolved by itself. Patient stated that when he is resting the dizziness and lightheadedness does not occur. Patient stated that on Tuesday he had similar episode and went to the PMD and was told to come to the ER. Patient stated that he had 2 episodes of NBNB vomiting yesterday. Patient stated that this episode of the dizziness/lightheadedness was more severe than the prior episodes he has had in the past. Patient denied any CP, fevers, chills, N/V/D/C, abdominal pain, cough, melena, hematochezia, dysuria, recent travel, sick contact, pleuritic or positional chest pain.     On ED admission EKG revealed NSR at a rate of 80 with Qtc of 422, CE x 2: Negative, HgA1C: 6.1, UA: Negative. CXR: No focal consolidations or edema. 1/11/18 Nuclear stress test: Post-stress gated wall motion analysis was performed (LVEF= 56 %;LVEDV = 85 ml.), revealing normal LV function. There was focal proximal to mid inferior wall hypokinesis with mildly diminished systolic thickening. RV function appeared normal. IMPRESSIONS: Normal Study.  Myocardial Perfusion SPECT results are probably abnormal. There is a small, moderate defect in inferior wall, that was reversible, suggestive of ischemia. The observed defect was significantly less prominent on prone imaging for attenuation correction, suggesting that the observed findings may in part be due to attenuation from the diaphragm. Post-stress gated wall motion analysis was performed (LVEF = 56 %;LVEDV = 85 ml.), revealing normal LV function. There was focal proximal to mid inferior wall hypokinesis with mildly diminished systolic thickening. RV function appeared normal. When examined patient is resting in the stretcher and denied any current complaints such as dizziness/lightheadedness or any CP.     Review of Systems:  · Negative General Symptoms	no fever; no chills; no sweating; no anorexia; no weight loss; no weight gain; no malaise; no fatigue	  · Negative Skin Symptoms	no rash; no itching; no dryness	  · Negative Ophthalmologic Symptoms	no diplopia; no photophobia; no lacrimation L; no lacrimation R; no blurred vision L; no blurred vision R; no discharge L; no discharge R	  · Negative ENMT Symptoms	no hearing difficulty; no ear pain; no tinnitus; no vertigo; no sinus symptoms; no nasal congestion; no nasal discharge	  · Negative Respiratory and Thorax Symptoms	no wheezing; no dyspnea; no cough; no hemoptysis; no pleuritic chest pain	  · Negative Cardiovascular Symptoms	no chest pain; no palpitations; no dyspnea on exertion; no orthopnea; no paroxysmal nocturnal dyspnea; no peripheral edema	  · Negative Gastrointestinal Symptoms	no nausea; no vomiting; no diarrhea; no constipation; no change in bowel habits; no abdominal pain; no melena; no hematochezia	  · Negative General Genitourinary Symptoms	no hematuria; no renal colic; no flank pain L; no flank pain R; no dysuria	  · Negative Musculoskeletal Symptoms	no arthralgia; no arthritis; no joint swelling; no myalgia; no muscle cramps; no muscle weakness; no stiffness; no neck pain	  · Negative Neurological Symptoms	no transient paralysis; no weakness; no paresthesias; no generalized seizures; no focal seizures; no syncope; no tremors; no vertigo; no loss of sensation; no difficulty walking; no headache; no loss of consciousness; no hemiparesis; no confusion	  · Neurological Comments	Dizziness and lightheadedness	      Allergies and Intolerances:        Allergies:  	No Known Allergies:     Home Medications:   * Incomplete Medication History as of 09-Jan-2018 21:04 documented in Structured Notes  · 	amLODIPine 5 mg oral tablet: 1 tab(s) orally once a day, Last Dose Taken:      Patient History:   Past Medical History:  No pertinent past medical history.    Past Surgical History:  No significant past surgical history.    Family History:  No pertinent family history in first degree relatives.    Social History:  Social History (marital status, living situation, occupation, tobacco use, alcohol and drug use, and sexual history): , lives with wife, retired mailman Never smoked/drank/used any illicit drugs	    Tobacco Screening:  · Core Measure Site	No	  · Has the patient used tobacco in the past 30 days?	No	    Risk Assessment:   Present on Admission:  Deep Venous Thrombosis	no	  Pulmonary Embolus	no	  Urinary Catheter	no	  Central Venous Catheter/PICC Line	no	  Surgical Site Incision	no	  Pressure Ulcer(s)	no	    Heart Failure:  Does this patient have a history of or has been diagnosed with heart failure? no.      Physical Exam:  · Constitutional	detailed exam	  · Constitutional Details	well-developed; well-groomed; well-nourished; no distress	  · Eyes	detailed exam	  · Eyes Details	conjunctiva clear	  · Neck	detailed exam	  · Neck Details	normal; supple; no JVD	  · Respiratory	detailed exam	  · Respiratory Details	normal; airway patent; respirations non-labored; no chest wall tenderness; no intercostal retractions; no rhonchi; no wheezes; rales	  · Rales	lower L; lower R	  · Cardiovascular	detailed exam	  · Cardiovascular Details	regular rate and rhythm  no rub  no murmur	  · Cardiovascular Details	positive S1; positive S2	  · Gastrointestinal	detailed exam	  · GI Normal	normal; soft; nontender; no distention; bowel sounds normal; no rebound tenderness; no guarding; no rigidity	  · Extremities	detailed exam	  · Extremities Details	normal; no clubbing; no cyanosis; no pedal edema	  · Vascular	detailed exam	  · Radial Pulse	right normal; left normal; +2 pulses b/l	  · DP Pulse	+1 pulses b/l	  · PT Pulse	+1 pulses b/l	  · Neurological	detailed exam	  · Neurological Details	alert and oriented x 3; responds to verbal commands; sensation intact	  · Musculoskeletal	detailed exam	  · Musculoskeletal Details	no joint swelling; no joint erythema; no joint warmth; no calf tenderness	      Labs and Results:  Labs, Radiology, Cardiology, and Other Results: CXR: No focal consolidations or edema.   1/11/18 Nuclear stress test: Post-stress gated wall motion analysis was performed (LVEF= 56 %;LVEDV = 85 ml.), revealing normal LV function. There was focal proximal to mid inferior wall hypokinesis with mildly diminished systolic thickening. RV function appeared normal. IMPRESSIONS: Normal Study.  Myocardial Perfusion SPECT results are probably abnormal. There is a small, moderate defect in inferior wall, that was reversble, suggestive of ischemia. The observed defect was significantly less prominent on prone imaging for attenuation correction, suggesting that the observed findings may in part be due to attenuation from the diaphragm. Post-stress gated wall motion analysis was performed (LVEF = 56 %;LVEDV = 85 ml.), revealing normal LV function. there was focal proximal to mid inferior wall hypokinesis with mildly diminished systolic thickening. RV function appeared normal.   EKG: NSR at a rate of 80 with Qtc of 422	    Assessment and Plan:   Assessment:  · Assessment		  63 y/o M with PMH of HTN presented with nausea and dizziness x 1 week.     +Abnormal stress test    Problem/Plan - 1:  ·  Problem: Abnormal stress test.  Plan: Will monitor on telemetry, serial EKG and CE prn for any episodes of chest pain  HgbA1C, TSH, lipid profile, CBC, CMP in am  Started patient on Metroprolol 12.5mg BID, Lipitor 40mg and Aspirin 81mg daily   s./p cardiac cath - nl coronaries    Problem/Plan - 2:  ·  Problem: Essential hypertension.  Plan: Continue with antihypertensive medications   DASH diet recommended.       Nausea- resolved, benign abd exam

## 2018-01-12 NOTE — DISCHARGE NOTE ADULT - INSTRUCTIONS
No heavy lifting greater than 5-10 pounds x one week.  No strenuous activity x 1 week.  Monitor site of procedure and notify your doctor for any redness/swelling/discharge.     No driving x 24 hours.  No heavy lifting for one week. No strenuous activity x 1 week.  Monitor site of procedure and notify your doctor for any redness/swelling/discharge.  You may shower but no baths or swimming for one week or until skin is healed. Call MD for return appt., or any return of symptoms, or any problems with wrist or groin area

## 2018-01-12 NOTE — DISCHARGE NOTE ADULT - MEDICATION SUMMARY - MEDICATIONS TO TAKE
I will START or STAY ON the medications listed below when I get home from the hospital:    amLODIPine 10 mg oral tablet  -- 1 tab(s) by mouth once a day  -- Indication: For hypertension

## 2020-10-16 ENCOUNTER — APPOINTMENT (OUTPATIENT)
Dept: DERMATOLOGY | Facility: CLINIC | Age: 65
End: 2020-10-16
Payer: COMMERCIAL

## 2020-10-16 VITALS — HEIGHT: 73 IN | BODY MASS INDEX: 27.3 KG/M2 | WEIGHT: 206 LBS

## 2020-10-16 DIAGNOSIS — L28.0 LICHEN SIMPLEX CHRONICUS: ICD-10-CM

## 2020-10-16 PROBLEM — Z00.00 ENCOUNTER FOR PREVENTIVE HEALTH EXAMINATION: Status: ACTIVE | Noted: 2020-10-16

## 2020-10-16 PROCEDURE — 99203 OFFICE O/P NEW LOW 30 MIN: CPT

## 2020-10-16 RX ORDER — FLUTICASONE PROPIONATE 0.05 MG/G
0.01 OINTMENT TOPICAL
Qty: 1 | Refills: 0 | Status: ACTIVE | COMMUNITY
Start: 2020-10-16 | End: 1900-01-01

## 2020-10-16 NOTE — ASSESSMENT
[FreeTextEntry1] : LSC/pruritus scroti; \par \par Therapeutic options and their risks and benefits; along with multiple diagnostic possibilities were discussed at length;\par \par cutivate ointment qd x 10d;  aquaphor in AM;  \par continue aquaphor maintenance; \par f/u 1 month to recheck;  do not over use steroid

## 2020-10-16 NOTE — PHYSICAL EXAM
[FreeTextEntry3] : Skin examination performed of the face, neck, chest, hands, lower legs;\par The patient is well, alert and oriented, pleasant and cooperative.\par Eyelids, conjunctivae, oral mucosa, digits and nails all normal.  \par No cervical adenopathy.\par \par \par Erythematous scaling patches with inflammation and lichenification;  b/l crural folds, scrotum; \par

## 2020-10-16 NOTE — HISTORY OF PRESENT ILLNESS
[de-identified] : Pt. c/o itchy rash in groin/genital area;  over 1 month; \par treated with ketoconazole cream, also taking terbinafine for fingernails;

## 2020-11-16 ENCOUNTER — APPOINTMENT (OUTPATIENT)
Dept: DERMATOLOGY | Facility: CLINIC | Age: 65
End: 2020-11-16

## 2021-05-29 NOTE — DISCHARGE NOTE ADULT - DO YOU HAVE DIFFICULTY CLIMBING STAIRS
HISTORY OF PRESENT ILLNESS  Patient reports that he has not been able to swallow pills. He has times with choking with drinking water. Sometimes at night while sleeping he has awakened in a panicky situation feeling that he is choking. He has had heartburn and GERD. He is taking ranitidine. He has also had difficulty swallowing solids. He has to eat in small bites. He also reports some hoarseness.     REVIEW OF SYSTEMS  Review of Systems: a 10-system review was performed. Pertinent positives are noted in the HPI and on a separate scanned document in the chart.    PMH, PSH, FH and SH has documented in the EHR.      EXAM    CONSTITUTIONAL  General Appearance:  Normal, well developed, well nourished, no obvious distress  Ability to Communicate:  communicates appropriately.    HEAD AND FACE  Appearance and Symmetry:  Normal, no scalp or facial scarring or suspicious lesions.    EARS  Clinical speech reception threshold:  Normal, able to hear normal speech.  Auricle:  Normal, Auricles without scars, lesions, masses.  External auditory canal:  Normal, External auditory canal normal.  Tympanic membrane:  Normal, Tympanic membranes normal without swelling or erythema.  Tympanic membrane mobility:  Normal, Normal tympanic membrane mobility.    NOSE (speculum or scope)  Architecture:  Normal, Grossly normal external nasal architecture with no masses or lesions.  Mucosa:  Normal mucosa, No polyps or masses.  Septum:  Normal, Septum non-obstructing.  Turbinates:  Normal, No turbinate abnormalities    ORAL CAVITY AND OROPHARYNX  Lips:  Normal.  Dental and gingiva:  Normal, No obvious dental or gingival disease.  Mucosa:  Normal, Moist mucous membranes.  Tongue:  Normal, Tongue mobile with no mucosal abnormalities  Hard and soft palate:  Normal, Hard and soft palate without cleft or mucosal lesions.  Oral pharynx:  Normal, Posterior pharynx without lesions or remarkable asymmetry.  Saliva:  Normal, Clear saliva.  Masses:  Normal,  Patient called no answer unable to leave VM No palpable masses or pathologically enlarged lymph nodes.    LARYNGOSCOPY  Risks and benefit of Flexible laryngeal endoscopy were discussed with the patient and they wished to proceed.  The nose was sprayed with 4% lidocaine / 1% phenylephrine.  After allowing adequate time for anesthesia the procedure was started.  Patient tolerated the procedure well.  See exam note for findings.    LARYNX AND HYPOPHARYNX (mirror or scope)  Voice Quality:  Normal voice quality.  Supra glottis:  Normal, No edema or erythema.  Epiglottis:  Normal, No epiglottic mass or mucosal lesions.  Pyriform sinuses:  Normal, Pyriform sinuses clear.  Vocal cords appearance:  Normal, Vocal cord motion symmetric.  Vocal cord motion:  Normal, Vocal cord motion symmetric  Endolarynx:  Normal, No Endolaryngeal mass or mucosal lesions.    NECK  Masses/lymph nodes:  Normal, No worrisome neck masses or lymph nodes.  Salivary glands:  Normal, Parotid and submandibular glands.  Trachea and larynx position:  Normal, Trachea and larynx midline.  Thyroid:  Normal, No thyroid abnormality.  Tenderness:  Normal, No cervical tenderness.  Suppleness:  Normal, Neck supple    NEUROLOGICAL  Speech pattern:  Normal, Proasaic    RESPIRATORY  Symmetry and Respiratory effort:  Normal, Symmetric chest movement and expansion with no increased intercostal retractions or use of accessory muscles.     IMPRESSION  Dysphagia. Exam is unremarkable. No obvious mass or obstruction of the oropharynx or hypopharynx.     RECOMMENDATION  Barium swallow to assess esophageal motility and possible narrowing or obstruciton.    Dhaval Condon MD   No

## 2021-08-24 ENCOUNTER — NON-APPOINTMENT (OUTPATIENT)
Age: 66
End: 2021-08-24

## 2021-08-24 ENCOUNTER — APPOINTMENT (OUTPATIENT)
Dept: DERMATOLOGY | Facility: CLINIC | Age: 66
End: 2021-08-24
Payer: COMMERCIAL

## 2021-08-24 VITALS — WEIGHT: 205 LBS | HEIGHT: 72 IN | BODY MASS INDEX: 27.77 KG/M2

## 2021-08-24 DIAGNOSIS — L03.011 CELLULITIS OF RIGHT FINGER: ICD-10-CM

## 2021-08-24 PROCEDURE — 99214 OFFICE O/P EST MOD 30 MIN: CPT

## 2021-08-24 RX ORDER — CLOTRIMAZOLE AND BETAMETHASONE DIPROPIONATE 10; .5 MG/G; MG/G
1-0.05 CREAM TOPICAL
Qty: 1 | Refills: 1 | Status: ACTIVE | COMMUNITY
Start: 2021-08-24 | End: 1900-01-01

## 2021-11-22 ENCOUNTER — EMERGENCY (EMERGENCY)
Facility: HOSPITAL | Age: 66
LOS: 0 days | Discharge: HOME | End: 2021-11-22
Payer: COMMERCIAL

## 2021-11-22 VITALS
WEIGHT: 212.08 LBS | RESPIRATION RATE: 17 BRPM | OXYGEN SATURATION: 98 % | SYSTOLIC BLOOD PRESSURE: 123 MMHG | TEMPERATURE: 99 F | DIASTOLIC BLOOD PRESSURE: 86 MMHG | HEART RATE: 97 BPM

## 2021-11-22 DIAGNOSIS — L03.011 CELLULITIS OF RIGHT FINGER: ICD-10-CM

## 2021-11-22 DIAGNOSIS — L02.511 CUTANEOUS ABSCESS OF RIGHT HAND: ICD-10-CM

## 2021-11-22 PROCEDURE — 10060 I&D ABSCESS SIMPLE/SINGLE: CPT

## 2021-11-22 PROCEDURE — 99283 EMERGENCY DEPT VISIT LOW MDM: CPT | Mod: 25

## 2021-11-22 RX ORDER — ACETAMINOPHEN 500 MG
650 TABLET ORAL ONCE
Refills: 0 | Status: COMPLETED | OUTPATIENT
Start: 2021-11-22 | End: 2021-11-22

## 2021-11-22 RX ADMIN — Medication 650 MILLIGRAM(S): at 17:10

## 2021-11-22 RX ADMIN — Medication 300 MILLIGRAM(S): at 17:10

## 2021-11-22 NOTE — ED ADULT NURSE NOTE - OBJECTIVE STATEMENT
Pt presents with thumb pain and swelling. Swelling appeared 3x days ago. Pt denies any trauma to thumb. Pus noted at bottom of cuticle.

## 2021-11-22 NOTE — ED PROVIDER NOTE - PATIENT PORTAL LINK FT
You can access the FollowMyHealth Patient Portal offered by [facility] by registering at the following website: [email or web address]. By joining [facility]’s FollowMyHealth portal, you will also be able to view your health information using other applications (apps) compatible with our system.

## 2021-11-22 NOTE — ED PROVIDER NOTE - CARE PROVIDER_API CALL
Terell Gonzales (MD)  Plastic Surgery  [address] [place] [address], [address]  [place], [place] [id number]  Phone: [phone]  Fax: [phone]  Follow Up Time:

## 2021-11-22 NOTE — ED PROVIDER NOTE - NSFOLLOWUPINSTRUCTIONS_ED_ALL_ED_FT
Advance activity as tolerated.  Continue all previously prescribed medications as directed unless otherwise instructed.  Follow up with your Hand surgeon on Wednesday,   Return to the ER for worsening or persistent symptoms, increased swelling with fever and/or ANY NEW OR CONCERNING SYMPTOMS. If you have issues obtaining follow up, please call: [phone] to obtain a doctor or specialist who takes your insurance in your area.  You may call [phone] to make an appointment with the internal medicine clinic.     Take clindamycin 300mg 3 times a day for 7 days. take this with food. even if your symptoms are improving you should complete the full course of antibiotics.     Take Tylenol 650mg every 6 hours as needed for pain    apply warm soaks every few hours for 20 minutes at a time to allow for further drainage.

## 2021-11-22 NOTE — ED PROVIDER NOTE - CLINICAL SUMMARY MEDICAL DECISION MAKING FREE TEXT BOX
65 y/o M pmhx HTN c/o swelling around right thumb nail and pain since Friday. limited ROM due to swelling surrounding the paronychia. post drainage improved ROM however considering extent of possible overlying cellulitis will cover with clindamycin PO and patient will follow up with hand on Wednesday.

## 2021-11-22 NOTE — ED PROVIDER NOTE - PROGRESS NOTE DETAILS
NP Ham: patient with increased ROM to right thumb IP joint with improvement of pain. post paranychea drainage procedure. will discharge on clinda and follow-up with hand. patient stable for discharge

## 2021-11-22 NOTE — ED ADULT NURSE NOTE - PRIMARY CARE PROVIDER
[Normal Conjunctiva] : the conjunctiva exhibited no abnormalities [Auscultation Breath Sounds / Voice Sounds] : lungs were clear to auscultation bilaterally [Heart Sounds] : normal S1 and S2 [Bowel Sounds] : normal bowel sounds [Abdomen Soft] : soft [Abdomen Tenderness] : non-tender [Abnormal Walk] : normal gait [Cyanosis, Localized] : no localized cyanosis [] : no rash [Affect] : the affect was normal [FreeTextEntry1] : pleasant Not at this time

## 2021-11-22 NOTE — ED PROVIDER NOTE - OBJECTIVE STATEMENT
65 y/o M pmhx HTN c/o swelling around right thumb nail and pain since Friday. he states that the pain started at the base of the finger nail and then slowly spread down his thumb now with swelling to the distal aspect of the thumb and joint. denies fever, chills. painful to touch. denies hx of diabetes. denies numbness/tingling to extremity.

## 2022-07-10 NOTE — PATIENT PROFILE ADULT. - BLOOD AVOIDANCE/RESTRICTIONS, PROFILE
Patient ID: Aleksandra Verdugo is a 3year old female. Chief Complaint   Patient presents with   â¢ Sports Physical   â¢ Office Visit       [address] 4 yo F, Patient here with mother for sports physical, participating in cheer. Denies previous sports injuries, fractures, hospitalizations, head trauma, concussions, LOC, seizures. Denies cardiac anomalies, asthma, or other health conditions. Denies cardiac or respiratory symptoms. Has never been denied clearance for sports in the past.  Denies family history of heart problems, murmurs, arrhythmias, Marfan syndrome, connective tissue disorders, or sudden deaths. See Sports history and physical form        Patient's medications, allergies, past medical, surgical, social and family histories were reviewed and updated as appropriate. Review of Systems   Constitutional: Negative for activity change, appetite change, chills, fatigue, fever and irritability. HENT: Negative for congestion, ear pain, postnasal drip, rhinorrhea, sinus pressure, sinus pain, sneezing and sore throat. Eyes: Negative for pain and discharge. Respiratory: Negative for chest tightness, shortness of breath and wheezing. Cardiovascular: Negative for chest pain. Gastrointestinal: Negative for abdominal distention, abdominal pain, constipation, diarrhea, nausea and vomiting. Genitourinary: Negative for flank pain and urgency. Skin: Negative for color change and rash. Allergic/Immunologic: Negative for environmental allergies and food allergies. Neurological: Negative for dizziness, light-headedness and headaches. Psychiatric/Behavioral: Negative for agitation and confusion. Patient Active Problem List   Diagnosis   â¢ Allergic rhinitis   â¢ Umbilical hernia   â¢ Chronic pain of left knee   â¢ Acute URI   â¢ Gastroenteritis       No current outpatient medications on file. No current facility-administered medications for this visit.        Past Medical History:   Diagnosis Date   â¢ "Allergy        Social History     Socioeconomic History   â¢ Marital status: Single     Spouse name: Not on file   â¢ Number of children: Not on file   â¢ Years of education: Not on file   â¢ Highest education level: Not on file   Occupational History   â¢ Not on file   Tobacco Use   â¢ Smoking status: Never Smoker   â¢ Smokeless tobacco: Never Used   Vaping Use   â¢ Vaping Use: never used   Substance and Sexual Activity   â¢ Alcohol use: Not on file   â¢ Drug use: Not on file   â¢ Sexual activity: Not on file   Other Topics Concern   â¢ Not on file   Social History Narrative   â¢ Not on file     Social Determinants of Health     Financial Resource Strain: Not on file   Food Insecurity: Not on file   Transportation Needs: Not on file   Physical Activity: Not on file   Stress: Not on file   Social Connections: Not on file   Intimate Partner Violence: Not on file       Social History     Tobacco Use   Smoking Status Never Smoker   Smokeless Tobacco Never Used       Past Surgical History:   Procedure Laterality Date   â¢ Repair umbilical ATPU,7+I/C,OWDXP         Family History   Problem Relation Age of Onset   â¢ Cancer Maternal Grandmother    â¢ Cancer Maternal Grandfather        No exam data present    ALLERGIES:  No Known Allergies    Immunization History   Administered Date(s) Administered   â¢ DTaP/HIB/IPV 2013, 2013, 2013, 11/22/2014   â¢ DTaP/IPV 08/15/2018   â¢ Hep A, Unspecified formulation 11/22/2014   â¢ Hep B, adult 2013, 2013, 2013   â¢ MMR 11/22/2014   â¢ Measles Mumps Rubella Varicella 08/15/2018   â¢ Pneumococcal Conjugate 13 Valent Vacc (Prevnar 13) 2013, 2013, 2013, 11/22/2014   â¢ Rotavirus - monovalent 2013, 2013, 2013   â¢ Varicella 11/22/2014       Visit Vitals  /68   Pulse 74   Resp 18   Ht 4' 9"" (1.448 m)   Wt 34.9 kg (76 lb 15.1 oz)   BMI 16.65 kg/mÂ²       Physical Exam  Vitals and nursing note reviewed.    Constitutional:       General: She " is active. Appearance: She is well-developed. HENT:      Mouth/Throat:      Pharynx: Oropharynx is clear. Neck: Normal range of motion and neck supple. Eyes:      Conjunctiva/sclera: Conjunctivae normal.      Pupils: Pupils are equal, round, and reactive to light. Cardiovascular:      Rate and Rhythm: Normal rate and regular rhythm. Heart sounds: S1 normal and S2 normal.   Pulmonary:      Effort: Pulmonary effort is normal.      Breath sounds: Normal breath sounds. Abdominal:      General: Bowel sounds are normal.      Palpations: Abdomen is soft. Musculoskeletal:         General: Normal range of motion. Skin:     General: Skin is warm and dry. Capillary Refill: Capillary refill takes less than 2 seconds. Neurological:      Mental Status: She is alert.          Plan:  Sports physical      Discussion:Cleared for participation in cheer  without restrictions          Jany Landa CNP none

## 2023-07-15 ENCOUNTER — EMERGENCY (EMERGENCY)
Facility: HOSPITAL | Age: 68
LOS: 0 days | Discharge: HOME | End: 2023-07-16
Attending: EMERGENCY MEDICINE
Payer: COMMERCIAL

## 2023-07-15 VITALS
DIASTOLIC BLOOD PRESSURE: 70 MMHG | TEMPERATURE: 101 F | WEIGHT: 205.03 LBS | RESPIRATION RATE: 16 BRPM | HEART RATE: 122 BPM | SYSTOLIC BLOOD PRESSURE: 108 MMHG | OXYGEN SATURATION: 97 %

## 2023-07-15 DIAGNOSIS — N39.0 URINARY TRACT INFECTION, SITE NOT SPECIFIED: ICD-10-CM

## 2023-07-15 DIAGNOSIS — I45.10 UNSPECIFIED RIGHT BUNDLE-BRANCH BLOCK: ICD-10-CM

## 2023-07-15 DIAGNOSIS — R33.8 OTHER RETENTION OF URINE: ICD-10-CM

## 2023-07-15 DIAGNOSIS — R00.0 TACHYCARDIA, UNSPECIFIED: ICD-10-CM

## 2023-07-15 DIAGNOSIS — R50.9 FEVER, UNSPECIFIED: ICD-10-CM

## 2023-07-15 DIAGNOSIS — Z20.822 CONTACT WITH AND (SUSPECTED) EXPOSURE TO COVID-19: ICD-10-CM

## 2023-07-15 DIAGNOSIS — N40.1 BENIGN PROSTATIC HYPERPLASIA WITH LOWER URINARY TRACT SYMPTOMS: ICD-10-CM

## 2023-07-15 DIAGNOSIS — A41.9 SEPSIS, UNSPECIFIED ORGANISM: ICD-10-CM

## 2023-07-15 DIAGNOSIS — I10 ESSENTIAL (PRIMARY) HYPERTENSION: ICD-10-CM

## 2023-07-15 DIAGNOSIS — R11.2 NAUSEA WITH VOMITING, UNSPECIFIED: ICD-10-CM

## 2023-07-15 LAB
APPEARANCE UR: ABNORMAL
APTT BLD: 33.4 SEC — SIGNIFICANT CHANGE UP (ref 27.5–35.5)
BASOPHILS # BLD AUTO: 0.04 K/UL — SIGNIFICANT CHANGE UP (ref 0–0.2)
BASOPHILS NFR BLD AUTO: 0.2 % — SIGNIFICANT CHANGE UP (ref 0–2)
BILIRUB UR-MCNC: NEGATIVE — SIGNIFICANT CHANGE UP
COLOR SPEC: YELLOW — SIGNIFICANT CHANGE UP
DIFF PNL FLD: ABNORMAL
EOSINOPHIL # BLD AUTO: 0.01 K/UL — SIGNIFICANT CHANGE UP (ref 0–0.5)
EOSINOPHIL NFR BLD AUTO: 0 % — SIGNIFICANT CHANGE UP (ref 0–6)
GLUCOSE UR QL: NEGATIVE MG/DL — SIGNIFICANT CHANGE UP
HCT VFR BLD CALC: 39.5 % — SIGNIFICANT CHANGE UP (ref 39–50)
HGB BLD-MCNC: 13.6 G/DL — SIGNIFICANT CHANGE UP (ref 13–17)
IMM GRANULOCYTES NFR BLD AUTO: 1 % — HIGH (ref 0–0.9)
INR BLD: 1.49 RATIO — HIGH (ref 0.88–1.16)
KETONES UR-MCNC: ABNORMAL
LACTATE SERPL-SCNC: 1.1 MMOL/L — SIGNIFICANT CHANGE UP (ref 0.7–2)
LEUKOCYTE ESTERASE UR-ACNC: ABNORMAL
LYMPHOCYTES # BLD AUTO: 0.94 K/UL — LOW (ref 1–3.3)
LYMPHOCYTES # BLD AUTO: 3.8 % — LOW (ref 13–44)
MCHC RBC-ENTMCNC: 29.8 PG — SIGNIFICANT CHANGE UP (ref 27–34)
MCHC RBC-ENTMCNC: 34.4 G/DL — SIGNIFICANT CHANGE UP (ref 32–36)
MCV RBC AUTO: 86.4 FL — SIGNIFICANT CHANGE UP (ref 80–100)
MONOCYTES # BLD AUTO: 1.67 K/UL — HIGH (ref 0–0.9)
MONOCYTES NFR BLD AUTO: 6.7 % — SIGNIFICANT CHANGE UP (ref 2–14)
NEUTROPHILS # BLD AUTO: 22 K/UL — HIGH (ref 1.8–7.4)
NEUTROPHILS NFR BLD AUTO: 88.3 % — HIGH (ref 43–77)
NITRITE UR-MCNC: NEGATIVE — SIGNIFICANT CHANGE UP
NRBC # BLD: 0 /100 WBCS — SIGNIFICANT CHANGE UP (ref 0–0)
PH UR: 6 — SIGNIFICANT CHANGE UP (ref 5–8)
PLATELET # BLD AUTO: 197 K/UL — SIGNIFICANT CHANGE UP (ref 150–400)
PROT UR-MCNC: 100 MG/DL
PROTHROM AB SERPL-ACNC: 18 SEC — HIGH (ref 10.5–13.4)
RBC # BLD: 4.57 M/UL — SIGNIFICANT CHANGE UP (ref 4.2–5.8)
RBC # FLD: 13.4 % — SIGNIFICANT CHANGE UP (ref 10.3–14.5)
SP GR SPEC: 1.01 — SIGNIFICANT CHANGE UP (ref 1.01–1.02)
UROBILINOGEN FLD QL: 1 MG/DL
WBC # BLD: 24.91 K/UL — HIGH (ref 3.8–10.5)
WBC # FLD AUTO: 24.91 K/UL — HIGH (ref 3.8–10.5)

## 2023-07-15 PROCEDURE — 93010 ELECTROCARDIOGRAM REPORT: CPT

## 2023-07-15 PROCEDURE — 99285 EMERGENCY DEPT VISIT HI MDM: CPT

## 2023-07-15 RX ORDER — IOHEXOL 300 MG/ML
30 INJECTION, SOLUTION INTRAVENOUS ONCE
Refills: 0 | Status: COMPLETED | OUTPATIENT
Start: 2023-07-15 | End: 2023-07-15

## 2023-07-15 RX ORDER — ACETAMINOPHEN 500 MG
975 TABLET ORAL ONCE
Refills: 0 | Status: COMPLETED | OUTPATIENT
Start: 2023-07-15 | End: 2023-07-15

## 2023-07-15 RX ORDER — SODIUM CHLORIDE 9 MG/ML
2900 INJECTION INTRAMUSCULAR; INTRAVENOUS; SUBCUTANEOUS ONCE
Refills: 0 | Status: COMPLETED | OUTPATIENT
Start: 2023-07-15 | End: 2023-07-15

## 2023-07-15 RX ADMIN — Medication 975 MILLIGRAM(S): at 22:47

## 2023-07-15 RX ADMIN — IOHEXOL 30 MILLILITER(S): 300 INJECTION, SOLUTION INTRAVENOUS at 22:48

## 2023-07-15 RX ADMIN — SODIUM CHLORIDE 2900 MILLILITER(S): 9 INJECTION INTRAMUSCULAR; INTRAVENOUS; SUBCUTANEOUS at 23:30

## 2023-07-15 NOTE — ED ADULT NURSE NOTE - NSFALLUNIVINTERV_ED_ALL_ED
Bed/Stretcher in lowest position, wheels locked, appropriate side rails in place/Call bell, personal items and telephone in reach/Instruct patient to call for assistance before getting out of bed/chair/stretcher/Non-slip footwear applied when patient is off stretcher/[place] to call system/Physically safe environment - no spills, clutter or unnecessary equipment/Purposeful proactive rounding/Room/bathroom lighting operational, light cord in reach

## 2023-07-15 NOTE — ED PROVIDER NOTE - OBJECTIVE STATEMENT
66 yo M with nausea vomiting today and yesterday with associated nausea that came on for no reason.  Pt. notes that he had his Quinn catheter changed 2 days ago at [facility] after he had it in since July 3rd (10 days).  Since being replaced at [facility], it's not draining well, and pt. now has suprapubic pain and urinary retention.  Fever noted on triage today.  No other focal or constitutional complaints.    ROS: negative for cough, headache, chest pain, shortness of breath, diarrhea, rash, paresthesia, and focal weakness--all other systems reviewed are negative.   PMH: HTN, BPH; Meds: tamsulosin; SH: Denies smoking/drinking/drug use

## 2023-07-15 NOTE — ED ADULT NURSE NOTE - OBJECTIVE STATEMENT
68 yo M with nausea vomiting today and yesterday with associated nausea that came on for no reason.  Pt. notes that he had his Quinn catheter changed 2 days ago at [facility] after he had it in since July 3rd (10 days).  Since being replaced at [facility], it's not draining well, and pt. now has suprapubic pain and urinary retention.  Fever noted on triage today.  No other focal or constitutional complaints. 2 IVs placed, 16 fr placed

## 2023-07-15 NOTE — ED PROVIDER NOTE - CLINICAL SUMMARY MEDICAL DECISION MAKING FREE TEXT BOX
68 yo M with sepsis, likely 2/2 uti, urinary retention  -basic labs, coags, blood cx, ua, cx, lactate, rvp, trop, CT ab/pel, CXR, ekg, iv, levoflox, hydration bolus, tylenol, monitor, replace Quinn   -f/u results, reeval

## 2023-07-15 NOTE — ED ADULT NURSE NOTE - CHIEF COMPLAINT QUOTE
As per wife, difficulty urinating while on vacation. morrow was placed in [place]. catheter removed and placed again at [facility]. now has fever, N/V, abd discomfort. takes tamsulosin daily. took tylenol this morning

## 2023-07-15 NOTE — ED PROVIDER NOTE - CARE PLAN
Principal Discharge DX:	Urinary retention  Secondary Diagnosis:	Sepsis  Secondary Diagnosis:	Acute UTI   1

## 2023-07-15 NOTE — ED PROVIDER NOTE - PATIENT PORTAL LINK FT
You can access the FollowMyHealth Patient Portal offered by [facility] by registering at the following website: [email or web address]. By joining [employer]’s FollowMyHealth portal, you will also be able to view your health information using other applications (apps) compatible with our system.

## 2023-07-15 NOTE — ED ADULT TRIAGE NOTE - CHIEF COMPLAINT QUOTE
As per wife, difficulty urinating while on vacation. morrow was placed in [place]. catheter removed and placed again at [facility]. now has fever, N/V, abd discomfort. takes tamsulosin daily. took tylenol this morning

## 2023-07-15 NOTE — ED PROVIDER NOTE - PROGRESS NOTE DETAILS
Results reported to patient-- UTI on UA explains fever, labs otherwise unremarkable, CT is consistent with cystitis, no evidence for ascending UTI   Pt. reports feeling better after meds, vitals normalized   pt. agrees to f/u with primary care outpt., uro referral given urgently for f/u   pt. understands to return to ED if symptoms worsen; will d/c with levoflox, as pt. prefers to treat at home, rather than be admitted

## 2023-07-15 NOTE — ED PROVIDER NOTE - PHYSICAL EXAMINATION
Vitals: tachy at 122, fever of 100.8  Gen: AAOx3, NAD, sitting uncomfortably in stretcher, calm, non-toxic   Head: ncat, perrla, eomi b/l  Neck: supple, no lymphadenopathy, no midline deviation  Heart: rrr, no m/r/g  Lungs: CTA b/l, no rales/ronchi/wheezes  Abd: soft, + suprapubic tenderness and bladder distension, no rebound or guarding  Ext: no clubbing/cyanosis/edema  Neuro: sensation and muscle strength intact b/l

## 2023-07-15 NOTE — ED PROVIDER NOTE - CARE PROVIDER_API CALL
Senait Cooper  Urology  [address] [place] [address]  Phone: [phone]  Fax: [phone]  Follow Up Time: Urgent

## 2023-07-16 VITALS
HEART RATE: 90 BPM | SYSTOLIC BLOOD PRESSURE: 128 MMHG | DIASTOLIC BLOOD PRESSURE: 78 MMHG | TEMPERATURE: 99 F | OXYGEN SATURATION: 96 % | RESPIRATION RATE: 19 BRPM

## 2023-07-16 PROBLEM — I10 ESSENTIAL (PRIMARY) HYPERTENSION: Chronic | Status: ACTIVE | Noted: 2021-11-22

## 2023-07-16 LAB
ALBUMIN SERPL ELPH-MCNC: 2.9 G/DL — LOW (ref 3.3–5)
ALP SERPL-CCNC: 61 U/L — SIGNIFICANT CHANGE UP (ref 40–120)
ALT FLD-CCNC: 46 U/L — SIGNIFICANT CHANGE UP (ref 12–78)
ANION GAP SERPL CALC-SCNC: 4 MMOL/L — LOW (ref 5–17)
AST SERPL-CCNC: 26 U/L — SIGNIFICANT CHANGE UP (ref 15–37)
BACTERIA # UR AUTO: ABNORMAL
BILIRUB SERPL-MCNC: 1 MG/DL — SIGNIFICANT CHANGE UP (ref 0.2–1.2)
BUN SERPL-MCNC: 24 MG/DL — HIGH (ref 7–23)
CALCIUM SERPL-MCNC: 8.3 MG/DL — LOW (ref 8.5–10.1)
CHLORIDE SERPL-SCNC: 102 MMOL/L — SIGNIFICANT CHANGE UP (ref 96–108)
CO2 SERPL-SCNC: 29 MMOL/L — SIGNIFICANT CHANGE UP (ref 22–31)
CREAT SERPL-MCNC: 1.45 MG/DL — HIGH (ref 0.5–1.3)
EGFR: 52 ML/MIN/1.73M2 — LOW
EPI CELLS # UR: SIGNIFICANT CHANGE UP
GLUCOSE SERPL-MCNC: 166 MG/DL — HIGH (ref 70–99)
POTASSIUM SERPL-MCNC: 3.4 MMOL/L — LOW (ref 3.5–5.3)
POTASSIUM SERPL-SCNC: 3.4 MMOL/L — LOW (ref 3.5–5.3)
PROT SERPL-MCNC: 7 GM/DL — SIGNIFICANT CHANGE UP (ref 6–8.3)
RAPID RVP RESULT: SIGNIFICANT CHANGE UP
RBC CASTS # UR COMP ASSIST: >50 /HPF (ref 0–4)
SARS-COV-2 RNA SPEC QL NAA+PROBE: SIGNIFICANT CHANGE UP
SODIUM SERPL-SCNC: 135 MMOL/L — SIGNIFICANT CHANGE UP (ref 135–145)
TROPONIN I, HIGH SENSITIVITY RESULT: 18.4 NG/L — SIGNIFICANT CHANGE UP
WBC UR QL: >50

## 2023-07-16 PROCEDURE — 71045 X-RAY EXAM CHEST 1 VIEW: CPT | Mod: 26

## 2023-07-16 PROCEDURE — 74177 CT ABD & PELVIS W/CONTRAST: CPT | Mod: 26,MA

## 2023-07-16 RX ORDER — LEVOFLOXACIN 5 MG/ML
1 INJECTION, SOLUTION INTRAVENOUS
Qty: 7 | Refills: 0
Start: 2023-07-16 | End: 2023-07-22

## 2023-07-16 RX ORDER — ACETAMINOPHEN 500 MG
2 TABLET ORAL
Qty: 10 | Refills: 0
Start: 2023-07-16 | End: 2023-07-20

## 2023-07-16 RX ORDER — POTASSIUM CHLORIDE 20 MEQ
40 PACKET (EA) ORAL ONCE
Refills: 0 | Status: COMPLETED | OUTPATIENT
Start: 2023-07-16 | End: 2023-07-16

## 2023-07-16 RX ADMIN — Medication 975 MILLIGRAM(S): at 01:41

## 2023-07-16 RX ADMIN — SODIUM CHLORIDE 2900 MILLILITER(S): 9 INJECTION INTRAMUSCULAR; INTRAVENOUS; SUBCUTANEOUS at 01:41

## 2023-07-16 RX ADMIN — Medication 40 MILLIEQUIVALENT(S): at 02:33

## 2023-07-19 LAB
-  AMIKACIN: SIGNIFICANT CHANGE UP
-  AMOXICILLIN/CLAVULANIC ACID: SIGNIFICANT CHANGE UP
-  AMPICILLIN/SULBACTAM: SIGNIFICANT CHANGE UP
-  AMPICILLIN: SIGNIFICANT CHANGE UP
-  AZTREONAM: SIGNIFICANT CHANGE UP
-  CEFAZOLIN: SIGNIFICANT CHANGE UP
-  CEFEPIME: SIGNIFICANT CHANGE UP
-  CEFTRIAXONE: SIGNIFICANT CHANGE UP
-  CEFUROXIME: SIGNIFICANT CHANGE UP
-  CIPROFLOXACIN: SIGNIFICANT CHANGE UP
-  ERTAPENEM: SIGNIFICANT CHANGE UP
-  GENTAMICIN: SIGNIFICANT CHANGE UP
-  IMIPENEM: SIGNIFICANT CHANGE UP
-  LEVOFLOXACIN: SIGNIFICANT CHANGE UP
-  MEROPENEM: SIGNIFICANT CHANGE UP
-  NITROFURANTOIN: SIGNIFICANT CHANGE UP
-  PIPERACILLIN/TAZOBACTAM: SIGNIFICANT CHANGE UP
-  TOBRAMYCIN: SIGNIFICANT CHANGE UP
-  TRIMETHOPRIM/SULFAMETHOXAZOLE: SIGNIFICANT CHANGE UP
METHOD TYPE: SIGNIFICANT CHANGE UP

## 2023-07-20 ENCOUNTER — EMERGENCY (EMERGENCY)
Facility: HOSPITAL | Age: 68
LOS: 0 days | Discharge: HOME | End: 2023-07-20
Attending: STUDENT IN AN ORGANIZED HEALTH CARE EDUCATION/TRAINING PROGRAM
Payer: COMMERCIAL

## 2023-07-20 VITALS
HEIGHT: 72 IN | SYSTOLIC BLOOD PRESSURE: 143 MMHG | OXYGEN SATURATION: 95 % | HEART RATE: 100 BPM | TEMPERATURE: 98 F | WEIGHT: 199.96 LBS | RESPIRATION RATE: 18 BRPM | DIASTOLIC BLOOD PRESSURE: 87 MMHG

## 2023-07-20 DIAGNOSIS — N40.1 BENIGN PROSTATIC HYPERPLASIA WITH LOWER URINARY TRACT SYMPTOMS: ICD-10-CM

## 2023-07-20 DIAGNOSIS — R20.8 OTHER DISTURBANCES OF SKIN SENSATION: ICD-10-CM

## 2023-07-20 DIAGNOSIS — R10.30 LOWER ABDOMINAL PAIN, UNSPECIFIED: ICD-10-CM

## 2023-07-20 DIAGNOSIS — R33.8 OTHER RETENTION OF URINE: ICD-10-CM

## 2023-07-20 DIAGNOSIS — I10 ESSENTIAL (PRIMARY) HYPERTENSION: ICD-10-CM

## 2023-07-20 LAB
-  AMPICILLIN: SIGNIFICANT CHANGE UP
-  CIPROFLOXACIN: SIGNIFICANT CHANGE UP
-  LEVOFLOXACIN: SIGNIFICANT CHANGE UP
-  NITROFURANTOIN: SIGNIFICANT CHANGE UP
-  TETRACYCLINE: SIGNIFICANT CHANGE UP
-  VANCOMYCIN: SIGNIFICANT CHANGE UP
ALBUMIN SERPL ELPH-MCNC: 2.8 G/DL — LOW (ref 3.3–5)
ALP SERPL-CCNC: 49 U/L — SIGNIFICANT CHANGE UP (ref 40–120)
ALT FLD-CCNC: 71 U/L — SIGNIFICANT CHANGE UP (ref 12–78)
ANION GAP SERPL CALC-SCNC: 5 MMOL/L — SIGNIFICANT CHANGE UP (ref 5–17)
ANISOCYTOSIS BLD QL: SLIGHT — SIGNIFICANT CHANGE UP
APPEARANCE UR: ABNORMAL
AST SERPL-CCNC: 30 U/L — SIGNIFICANT CHANGE UP (ref 15–37)
BACTERIA # UR AUTO: ABNORMAL
BASOPHILS # BLD AUTO: 0 K/UL — SIGNIFICANT CHANGE UP (ref 0–0.2)
BASOPHILS NFR BLD AUTO: 0 % — SIGNIFICANT CHANGE UP (ref 0–2)
BILIRUB SERPL-MCNC: 0.6 MG/DL — SIGNIFICANT CHANGE UP (ref 0.2–1.2)
BILIRUB UR-MCNC: NEGATIVE — SIGNIFICANT CHANGE UP
BUN SERPL-MCNC: 13 MG/DL — SIGNIFICANT CHANGE UP (ref 7–23)
CALCIUM SERPL-MCNC: 8.9 MG/DL — SIGNIFICANT CHANGE UP (ref 8.5–10.1)
CHLORIDE SERPL-SCNC: 107 MMOL/L — SIGNIFICANT CHANGE UP (ref 96–108)
CO2 SERPL-SCNC: 30 MMOL/L — SIGNIFICANT CHANGE UP (ref 22–31)
COLOR SPEC: YELLOW — SIGNIFICANT CHANGE UP
CREAT SERPL-MCNC: 1.04 MG/DL — SIGNIFICANT CHANGE UP (ref 0.5–1.3)
CULTURE RESULTS: SIGNIFICANT CHANGE UP
DACRYOCYTES BLD QL SMEAR: SLIGHT — SIGNIFICANT CHANGE UP
DIFF PNL FLD: ABNORMAL
EGFR: 78 ML/MIN/1.73M2 — SIGNIFICANT CHANGE UP
EOSINOPHIL # BLD AUTO: 0.08 K/UL — SIGNIFICANT CHANGE UP (ref 0–0.5)
EOSINOPHIL NFR BLD AUTO: 1 % — SIGNIFICANT CHANGE UP (ref 0–6)
EPI CELLS # UR: SIGNIFICANT CHANGE UP
GLUCOSE SERPL-MCNC: 109 MG/DL — HIGH (ref 70–99)
GLUCOSE UR QL: NEGATIVE MG/DL — SIGNIFICANT CHANGE UP
HCT VFR BLD CALC: 39.1 % — SIGNIFICANT CHANGE UP (ref 39–50)
HGB BLD-MCNC: 13.1 G/DL — SIGNIFICANT CHANGE UP (ref 13–17)
KETONES UR-MCNC: NEGATIVE — SIGNIFICANT CHANGE UP
LEUKOCYTE ESTERASE UR-ACNC: ABNORMAL
LG PLATELETS BLD QL AUTO: SLIGHT — SIGNIFICANT CHANGE UP
LYMPHOCYTES # BLD AUTO: 1.85 K/UL — SIGNIFICANT CHANGE UP (ref 1–3.3)
LYMPHOCYTES # BLD AUTO: 22 % — SIGNIFICANT CHANGE UP (ref 13–44)
MACROCYTES BLD QL: SLIGHT — SIGNIFICANT CHANGE UP
MANUAL SMEAR VERIFICATION: SIGNIFICANT CHANGE UP
MCHC RBC-ENTMCNC: 29.3 PG — SIGNIFICANT CHANGE UP (ref 27–34)
MCHC RBC-ENTMCNC: 33.5 G/DL — SIGNIFICANT CHANGE UP (ref 32–36)
MCV RBC AUTO: 87.5 FL — SIGNIFICANT CHANGE UP (ref 80–100)
METHOD TYPE: SIGNIFICANT CHANGE UP
MONOCYTES # BLD AUTO: 1.26 K/UL — HIGH (ref 0–0.9)
MONOCYTES NFR BLD AUTO: 15 % — HIGH (ref 2–14)
NEUTROPHILS # BLD AUTO: 5.06 K/UL — SIGNIFICANT CHANGE UP (ref 1.8–7.4)
NEUTROPHILS NFR BLD AUTO: 60 % — SIGNIFICANT CHANGE UP (ref 43–77)
NITRITE UR-MCNC: NEGATIVE — SIGNIFICANT CHANGE UP
NRBC # BLD: 0 /100 — SIGNIFICANT CHANGE UP (ref 0–0)
NRBC # BLD: SIGNIFICANT CHANGE UP /100 WBCS (ref 0–0)
ORGANISM # SPEC MICROSCOPIC CNT: SIGNIFICANT CHANGE UP
PH UR: 6.5 — SIGNIFICANT CHANGE UP (ref 5–8)
PLAT MORPH BLD: NORMAL — SIGNIFICANT CHANGE UP
PLATELET # BLD AUTO: 364 K/UL — SIGNIFICANT CHANGE UP (ref 150–400)
POTASSIUM SERPL-MCNC: 4 MMOL/L — SIGNIFICANT CHANGE UP (ref 3.5–5.3)
POTASSIUM SERPL-SCNC: 4 MMOL/L — SIGNIFICANT CHANGE UP (ref 3.5–5.3)
PROT SERPL-MCNC: 6.8 GM/DL — SIGNIFICANT CHANGE UP (ref 6–8.3)
PROT UR-MCNC: 30 MG/DL
RBC # BLD: 4.47 M/UL — SIGNIFICANT CHANGE UP (ref 4.2–5.8)
RBC # FLD: 13.8 % — SIGNIFICANT CHANGE UP (ref 10.3–14.5)
RBC BLD AUTO: SIGNIFICANT CHANGE UP
RBC CASTS # UR COMP ASSIST: NEGATIVE /HPF — SIGNIFICANT CHANGE UP (ref 0–4)
SODIUM SERPL-SCNC: 142 MMOL/L — SIGNIFICANT CHANGE UP (ref 135–145)
SP GR SPEC: 1.01 — SIGNIFICANT CHANGE UP (ref 1.01–1.02)
SPECIMEN SOURCE: SIGNIFICANT CHANGE UP
UROBILINOGEN FLD QL: NEGATIVE MG/DL — SIGNIFICANT CHANGE UP
VARIANT LYMPHS # BLD: 2 % — SIGNIFICANT CHANGE UP (ref 0–6)
WBC # BLD: 8.43 K/UL — SIGNIFICANT CHANGE UP (ref 3.8–10.5)
WBC # FLD AUTO: 8.43 K/UL — SIGNIFICANT CHANGE UP (ref 3.8–10.5)
WBC UR QL: SIGNIFICANT CHANGE UP

## 2023-07-20 PROCEDURE — 99284 EMERGENCY DEPT VISIT MOD MDM: CPT

## 2023-07-20 NOTE — ED PROVIDER NOTE - PHYSICAL EXAMINATION
General appearance: Nontoxic appearing, conversant, afebrile    Eyes: anicteric sclerae, WALLY, EOMI   HENT: Atraumatic; oropharynx clear, MMM and no ulcerations, no pharyngeal erythema or exudate   Neck: Trachea midline; Full range of motion, supple   Pulm: CTA bl, normal respiratory effort and no intercostal retractions, normal work of breathing   CV: RRR, No murmurs, rubs, or gallops   Abdomen: Soft, non-tender, non-distended; no guarding or rebound   Extremities: No peripheral edema, no gross deformities, FROM x4   Skin: Dry, normal temperature, turgor and texture; no rash   Psych: Appropriate affect, cooperative

## 2023-07-20 NOTE — ED PROVIDER NOTE - OBJECTIVE STATEMENT
68yo male with pmh htn, bph, presenting with urinary retention.  Had morrow removed this morning in urologists office around 9am and hasn't been able to urinate.  Endorsing suprapubic discomfort which has since been improved after morrow placement.  Slight burning of penis but no fevers, back pain.

## 2023-07-20 NOTE — ED ADULT NURSE NOTE - OBJECTIVE STATEMENT
Pt presents to the ED for c/o urinary retention, morrow catheter in place. A&OX4 and in no acute distress. Respirations even and unlabored. All safety initiated and maintained. ZAHRA Blackburn RN

## 2023-07-20 NOTE — ED ADULT TRIAGE NOTE - CHIEF COMPLAINT QUOTE
Patient had morrow removed at urology office this morning and has not been able to urinate since. Lower abdominal pain. Morrow was placed here in ED 7/16/23  pmhx: HTN

## 2023-07-20 NOTE — ED PROVIDER NOTE - PATIENT PORTAL LINK FT
You can access the FollowMyHealth Patient Portal offered by [facility] by registering at the following website: [email or web address]. By joining [employer]’s FollowMyHealth portal, you will also be able to view your health information using other applications (apps) compatible with our system.

## 2023-07-20 NOTE — ED ADULT NURSE REASSESSMENT NOTE - NS ED NURSE REASSESS COMMENT FT1
Pt given d/c paper work; instructions provided and pt verbalized understanding. As per MD order, replaced drainage bag for leg bag. Pt left ED in stable condition. CHENTE Blackburn. RN

## 2023-07-20 NOTE — ED PROVIDER NOTE - CLINICAL SUMMARY MEDICAL DECISION MAKING FREE TEXT BOX
Urinary retention improved after morrow.  Catheter placed on arrival and on my eval, 1.5L in urine bag.  Will eval for electrolyte abn, dionicio.  With burning will r/o uti.  Well appearing otherwise, anticipate dc with urology follow up.

## 2023-07-20 NOTE — ED ADULT NURSE REASSESSMENT NOTE - NSFALLUNIVINTERV_ED_ALL_ED
Bed/Stretcher in lowest position, wheels locked, appropriate side rails in place/Call bell, personal items and telephone in reach/Instruct patient to call for assistance before getting out of bed/chair/stretcher/Non-slip footwear applied when patient is off stretcher/[place] to call system/Physically safe environment - no spills, clutter or unnecessary equipment/Purposeful proactive rounding/Room/bathroom lighting operational, light cord in reach

## 2023-07-21 LAB
CULTURE RESULTS: SIGNIFICANT CHANGE UP
SPECIMEN SOURCE: SIGNIFICANT CHANGE UP

## 2023-07-29 ENCOUNTER — EMERGENCY (EMERGENCY)
Facility: HOSPITAL | Age: 68
LOS: 0 days | Discharge: HOME | End: 2023-07-29
Payer: COMMERCIAL

## 2023-07-29 VITALS
TEMPERATURE: 98 F | RESPIRATION RATE: 18 BRPM | HEART RATE: 96 BPM | HEIGHT: 72 IN | WEIGHT: 205.03 LBS | DIASTOLIC BLOOD PRESSURE: 86 MMHG | SYSTOLIC BLOOD PRESSURE: 134 MMHG | OXYGEN SATURATION: 98 %

## 2023-07-29 DIAGNOSIS — I10 ESSENTIAL (PRIMARY) HYPERTENSION: ICD-10-CM

## 2023-07-29 DIAGNOSIS — R33.9 RETENTION OF URINE, UNSPECIFIED: ICD-10-CM

## 2023-07-29 DIAGNOSIS — N39.0 URINARY TRACT INFECTION, SITE NOT SPECIFIED: ICD-10-CM

## 2023-07-29 DIAGNOSIS — R36.9 URETHRAL DISCHARGE, UNSPECIFIED: ICD-10-CM

## 2023-07-29 LAB
ALBUMIN SERPL ELPH-MCNC: 3.3 G/DL — SIGNIFICANT CHANGE UP (ref 3.3–5)
ALP SERPL-CCNC: 50 U/L — SIGNIFICANT CHANGE UP (ref 40–120)
ALT FLD-CCNC: 31 U/L — SIGNIFICANT CHANGE UP (ref 12–78)
ANION GAP SERPL CALC-SCNC: 5 MMOL/L — SIGNIFICANT CHANGE UP (ref 5–17)
APPEARANCE UR: CLEAR — SIGNIFICANT CHANGE UP
AST SERPL-CCNC: 15 U/L — SIGNIFICANT CHANGE UP (ref 15–37)
BACTERIA # UR AUTO: ABNORMAL
BASOPHILS # BLD AUTO: 0.02 K/UL — SIGNIFICANT CHANGE UP (ref 0–0.2)
BASOPHILS NFR BLD AUTO: 0.3 % — SIGNIFICANT CHANGE UP (ref 0–2)
BILIRUB SERPL-MCNC: 0.6 MG/DL — SIGNIFICANT CHANGE UP (ref 0.2–1.2)
BILIRUB UR-MCNC: NEGATIVE — SIGNIFICANT CHANGE UP
BUN SERPL-MCNC: 12 MG/DL — SIGNIFICANT CHANGE UP (ref 7–23)
CALCIUM SERPL-MCNC: 8.9 MG/DL — SIGNIFICANT CHANGE UP (ref 8.5–10.1)
CHLORIDE SERPL-SCNC: 108 MMOL/L — SIGNIFICANT CHANGE UP (ref 96–108)
CO2 SERPL-SCNC: 30 MMOL/L — SIGNIFICANT CHANGE UP (ref 22–31)
COLOR SPEC: YELLOW — SIGNIFICANT CHANGE UP
CREAT SERPL-MCNC: 1.11 MG/DL — SIGNIFICANT CHANGE UP (ref 0.5–1.3)
DIFF PNL FLD: ABNORMAL
EGFR: 72 ML/MIN/1.73M2 — SIGNIFICANT CHANGE UP
EOSINOPHIL # BLD AUTO: 0.06 K/UL — SIGNIFICANT CHANGE UP (ref 0–0.5)
EOSINOPHIL NFR BLD AUTO: 0.8 % — SIGNIFICANT CHANGE UP (ref 0–6)
EPI CELLS # UR: SIGNIFICANT CHANGE UP
GLUCOSE SERPL-MCNC: 109 MG/DL — HIGH (ref 70–99)
GLUCOSE UR QL: NEGATIVE MG/DL — SIGNIFICANT CHANGE UP
HCT VFR BLD CALC: 38.5 % — LOW (ref 39–50)
HGB BLD-MCNC: 12.5 G/DL — LOW (ref 13–17)
IMM GRANULOCYTES NFR BLD AUTO: 0.4 % — SIGNIFICANT CHANGE UP (ref 0–0.9)
KETONES UR-MCNC: NEGATIVE — SIGNIFICANT CHANGE UP
LEUKOCYTE ESTERASE UR-ACNC: ABNORMAL
LYMPHOCYTES # BLD AUTO: 1.15 K/UL — SIGNIFICANT CHANGE UP (ref 1–3.3)
LYMPHOCYTES # BLD AUTO: 16.1 % — SIGNIFICANT CHANGE UP (ref 13–44)
MCHC RBC-ENTMCNC: 28.7 PG — SIGNIFICANT CHANGE UP (ref 27–34)
MCHC RBC-ENTMCNC: 32.5 G/DL — SIGNIFICANT CHANGE UP (ref 32–36)
MCV RBC AUTO: 88.3 FL — SIGNIFICANT CHANGE UP (ref 80–100)
MONOCYTES # BLD AUTO: 0.65 K/UL — SIGNIFICANT CHANGE UP (ref 0–0.9)
MONOCYTES NFR BLD AUTO: 9.1 % — SIGNIFICANT CHANGE UP (ref 2–14)
NEUTROPHILS # BLD AUTO: 5.23 K/UL — SIGNIFICANT CHANGE UP (ref 1.8–7.4)
NEUTROPHILS NFR BLD AUTO: 73.3 % — SIGNIFICANT CHANGE UP (ref 43–77)
NITRITE UR-MCNC: NEGATIVE — SIGNIFICANT CHANGE UP
NRBC # BLD: 0 /100 WBCS — SIGNIFICANT CHANGE UP (ref 0–0)
PH UR: 6 — SIGNIFICANT CHANGE UP (ref 5–8)
PLATELET # BLD AUTO: 356 K/UL — SIGNIFICANT CHANGE UP (ref 150–400)
POTASSIUM SERPL-MCNC: 3.7 MMOL/L — SIGNIFICANT CHANGE UP (ref 3.5–5.3)
POTASSIUM SERPL-SCNC: 3.7 MMOL/L — SIGNIFICANT CHANGE UP (ref 3.5–5.3)
PROT SERPL-MCNC: 7.5 GM/DL — SIGNIFICANT CHANGE UP (ref 6–8.3)
PROT UR-MCNC: 15 MG/DL
RBC # BLD: 4.36 M/UL — SIGNIFICANT CHANGE UP (ref 4.2–5.8)
RBC # FLD: 13.6 % — SIGNIFICANT CHANGE UP (ref 10.3–14.5)
RBC CASTS # UR COMP ASSIST: ABNORMAL /HPF (ref 0–4)
SODIUM SERPL-SCNC: 143 MMOL/L — SIGNIFICANT CHANGE UP (ref 135–145)
SP GR SPEC: 1.01 — SIGNIFICANT CHANGE UP (ref 1.01–1.02)
UROBILINOGEN FLD QL: NEGATIVE MG/DL — SIGNIFICANT CHANGE UP
WBC # BLD: 7.14 K/UL — SIGNIFICANT CHANGE UP (ref 3.8–10.5)
WBC # FLD AUTO: 7.14 K/UL — SIGNIFICANT CHANGE UP (ref 3.8–10.5)
WBC UR QL: ABNORMAL

## 2023-07-29 PROCEDURE — 99284 EMERGENCY DEPT VISIT MOD MDM: CPT

## 2023-07-29 RX ORDER — CEFDINIR 250 MG/5ML
1 POWDER, FOR SUSPENSION ORAL
Qty: 14 | Refills: 0
Start: 2023-07-29 | End: 2023-08-04

## 2023-07-29 RX ORDER — SODIUM CHLORIDE 9 MG/ML
1000 INJECTION INTRAMUSCULAR; INTRAVENOUS; SUBCUTANEOUS ONCE
Refills: 0 | Status: COMPLETED | OUTPATIENT
Start: 2023-07-29 | End: 2023-07-29

## 2023-07-29 RX ORDER — CEFTRIAXONE 500 MG/1
1000 INJECTION, POWDER, FOR SOLUTION INTRAMUSCULAR; INTRAVENOUS ONCE
Refills: 0 | Status: COMPLETED | OUTPATIENT
Start: 2023-07-29 | End: 2023-07-29

## 2023-07-29 RX ADMIN — SODIUM CHLORIDE 1000 MILLILITER(S): 9 INJECTION INTRAMUSCULAR; INTRAVENOUS; SUBCUTANEOUS at 12:14

## 2023-07-29 RX ADMIN — SODIUM CHLORIDE 1000 MILLILITER(S): 9 INJECTION INTRAMUSCULAR; INTRAVENOUS; SUBCUTANEOUS at 11:14

## 2023-07-29 RX ADMIN — CEFTRIAXONE 100 MILLIGRAM(S): 500 INJECTION, POWDER, FOR SOLUTION INTRAMUSCULAR; INTRAVENOUS at 12:15

## 2023-07-29 RX ADMIN — CEFTRIAXONE 1000 MILLIGRAM(S): 500 INJECTION, POWDER, FOR SOLUTION INTRAMUSCULAR; INTRAVENOUS at 12:37

## 2023-07-29 NOTE — ED ADULT NURSE NOTE - OBJECTIVE STATEMENT
patient alert and oriented x.4 pt c/o unable to urinate and lower abdominal pain since last night. pt states he passed a few drops of urine a few minutes ago. history of urinary rentention, states he has had Quinn's placed multiple times. denies burning with urination. states he also has white penile discharge for a few days. abd distention noted.

## 2023-07-29 NOTE — ED PROVIDER NOTE - NSFOLLOWUPINSTRUCTIONS_ED_ALL_ED_FT
Today you were seen in the ER for urinary retention and found to have a UTI.     Please see below for a copy of your results.     A prescription for Cefdinir was sent to the pharmacy - read all instructions and take as directed.     Urinary Tract Infection    A urinary tract infection (UTI) is an infection of any part of the urinary tract, which includes the kidneys, ureters, bladder, and urethra. Risk factors include ignoring your need to urinate, wiping back to front if female, being an uncircumcised male, and having diabetes or a weak immune system. Symptoms include frequent urination, pain or burning with urination, foul smelling urine, cloudy urine, pain in the lower abdomen, blood in the urine, and fever. If you were prescribed an antibiotic medicine, take it as told by your health care provider. Do not stop taking the antibiotic even if you start to feel better.    SEEK IMMEDIATE MEDICAL CARE IF YOU HAVE ANY OF THE FOLLOWING SYMPTOMS: severe back or abdominal pain, fever, inability to keep fluids or medicine down, dizziness/lightheadedness, or a change in mental status.    Advance activity as tolerated.      Continue all previously prescribed medications as directed unless otherwise instructed.      Follow up with your primary care physician and your urologist in 48-72 hours- bring copies of your results.

## 2023-07-29 NOTE — ED PROVIDER NOTE - PATIENT PORTAL LINK FT
You can access the FollowMyHealth Patient Portal offered by [facility] by registering at the following website: [email or web address]. By joining [employer]’s FollowMyHealth portal, you will also be able to view your health information using other applications (apps) compatible with our system.

## 2023-07-29 NOTE — ED PROVIDER NOTE - OBJECTIVE STATEMENT
67-year-old male with past medical history of hypertension, urinary retention in the past followed by urology (foleys in the past) presents emergency room for urinary retention.  Patient reports inability to urinate since last night.  Reports improvement of pain and discomfort after having Quinn placed.  Denies fever, chills, abdominal pain, nausea, vomiting, diarrhea, painful urination, increased urinary frequency.  Reports an episode of abnormal penile discharge earlier in the week without persistent discharge.  Denies concern for STDs.  Has follow-up with urologist in 4 days.

## 2023-07-29 NOTE — ED PROVIDER NOTE - CLINICAL SUMMARY MEDICAL DECISION MAKING FREE TEXT BOX
67-year-old male with past medical history of hypertension, urinary retention in the past followed by urology presents emergency room for urinary retention since last night, morrow with 800cc of yellow urine. Denies fever, chills, abdominal pain, nausea, vomiting, diarrhea, painful urination, increased urinary frequency.  Reports an episode of abnormal penile discharge earlier in the week without persistent discharge.  Denies concern for STDs.  Vital signs stable, suprapubic discomfort, labs showing UTI without CHRISTINA or elevated WBC, will give abx, dc with urology follow up on Wednesday as scheduled. no indication for admission given normal Cr, no WBC and close follow up. Strict return precautions provided.

## 2023-07-29 NOTE — ED ADULT TRIAGE NOTE - CHIEF COMPLAINT QUOTE
pt c/o unable to urinate and lower abdominal pain  since last night. pt states he passed a few drops of urine a few minutes ago. history of urinary rentention. denies burning with urination. states he also has white penile discharge for a few days.

## 2023-07-29 NOTE — ED ADULT NURSE NOTE - NS ED NURSE DC INFO COMPLEXITY
Clear bilaterally, pupils equal, round and reactive to light. Simple: Patient demonstrates quick and easy understanding/Straightforward: Basic instructions, no meds, no home treatment/Verbalized Understanding

## 2023-07-29 NOTE — ED ADULT NURSE NOTE - MODE OF DISCHARGE
Ambulatory Doxepin Counseling:  Patient advised that the medication is sedating and not to drive a car after taking this medication. Patient informed of potential adverse effects including but not limited to dry mouth, urinary retention, and blurry vision.  The patient verbalized understanding of the proper use and possible adverse effects of doxepin.  All of the patient's questions and concerns were addressed.

## 2023-07-30 LAB
CULTURE RESULTS: NO GROWTH — SIGNIFICANT CHANGE UP
SPECIMEN SOURCE: SIGNIFICANT CHANGE UP

## 2023-07-31 LAB
C TRACH RRNA SPEC QL NAA+PROBE: SIGNIFICANT CHANGE UP
N GONORRHOEA RRNA SPEC QL NAA+PROBE: SIGNIFICANT CHANGE UP
SPECIMEN SOURCE: SIGNIFICANT CHANGE UP

## 2025-03-05 NOTE — ED CDU PROVIDER SUBSEQUENT DAY NOTE - PROGRESS NOTE ADDITIONAL3
Patient febrile in afternoon 2/22  - CT abdomen on 2/23 with 3.7 cm rim-enhancing fluid collection anterior to the right psoas muscle in the pelvis as well as a more heterogeneous enhancing structure anterior and medial to this collection measuring 2.7 cm c/f bilobed abscess. IR prefers to defer intervention for now given significant surrounding vasculature  - CT on 2/9 taken at [facility]: "nonspecific hypoattenuating lesion/structure in the right lower quadrant measuring up to 4.4x4.2 cm with peripheral wall enhancement may represent adnexal cyst."  - TTE 2/12 & 2/24 no evidence valve vegetations  - Bcx NGTD from 2/22  - MR TLS: redemonstrates abscess, no evidence of extension of disease elsewhere or OM  - IR consulted, high-risk of bleed for aspiration, plan to monitor on abx and rescan to assess at later date  - Repeat CT A/P on 3/3: Interval decrease in abscess size    Plan:  IV ceftriaxone, flagyl  Per ID: Can transition to cefpodoxime 400 mg oral bid + Flagyl 500 mg oral bid and repeat CT A/P with IV contrast week of 3/17 + continue PO Vancomycin 125 BID while on these antibiotics for c diff prevention Additional Progress Note...

## 2025-07-08 NOTE — ED PROVIDER NOTE - NSFOLLOWUPINSTRUCTIONS_ED_ALL_ED_FT
Rest, drink plenty of fluids  Advance activity as tolerated  Continue all previously prescribed medications as directed  Follow up with your PMD - bring copies of your results  Return to the ER for severe pain, if your catheter stops draining, fevers, or other new or concerning symptoms   Follow up with the urologist
Negative Screen
